# Patient Record
Sex: FEMALE | Race: BLACK OR AFRICAN AMERICAN | Employment: FULL TIME | URBAN - METROPOLITAN AREA
[De-identification: names, ages, dates, MRNs, and addresses within clinical notes are randomized per-mention and may not be internally consistent; named-entity substitution may affect disease eponyms.]

---

## 2017-02-06 ENCOUNTER — OFFICE VISIT (OUTPATIENT)
Dept: ENDOCRINOLOGY | Age: 42
End: 2017-02-06

## 2017-02-06 VITALS
HEART RATE: 57 BPM | DIASTOLIC BLOOD PRESSURE: 91 MMHG | WEIGHT: 149.9 LBS | TEMPERATURE: 98.2 F | BODY MASS INDEX: 23.53 KG/M2 | SYSTOLIC BLOOD PRESSURE: 128 MMHG | HEIGHT: 67 IN | RESPIRATION RATE: 16 BRPM

## 2017-02-06 DIAGNOSIS — E05.90 HYPERTHYROIDISM: Primary | ICD-10-CM

## 2017-02-06 DIAGNOSIS — E05.90 HYPERTHYROIDISM: ICD-10-CM

## 2017-02-06 DIAGNOSIS — E05.00 GRAVES DISEASE: ICD-10-CM

## 2017-02-06 RX ORDER — PROPRANOLOL HYDROCHLORIDE 20 MG/1
TABLET ORAL
Refills: 0 | COMMUNITY
Start: 2017-01-17 | End: 2017-07-19 | Stop reason: DRUGHIGH

## 2017-02-06 NOTE — PROGRESS NOTES
[facility] ENDOCRINOLOGY               Joan Mina MD        [address] 78 [phone] Fax [phone]           Patient Information  Date:2/6/2017  Name : Mayra Childers 39 y.o.     YOB: 1975         Referred by: John Paul Gimenez DO         Chief Complaint   Patient presents with    Thyroid Problem     f/u       History of present illness    Mayra Childers is a 39 y.o. female  here for fu of thyroid. She was diagnosed with hyperthyroidism/Graves disease 10 years ago, intermittently on PTU, reports fluctuating weight. She is off PTU for several months. She attributed hair loss to PTU and hence stopped it. She was seen in June 2016 - and in Oct 2016 - Methimazole was started then     She went to the ER with racing of the heart and was referred to cardiologist.  She was started on Atenolol and echocardiogram was done. She had stopped antithyroid medications in the past thinking that is the cause for hair loss, now reportedly she is taking it consistently 20 mg. No labs. She has gained weight, but not lost.  No sore throat or fever, recent iodine exposure. FH of thyroid disease. Wt Readings from Last 3 Encounters:   02/06/17 149 lb 14.4 oz (68 kg)   10/19/16 145 lb 11.2 oz (66.1 kg)   06/07/16 151 lb (68.5 kg)       Past Medical History   Diagnosis Date    Hx traumatic fracture     Hyperthyroidism        Current Outpatient Prescriptions   Medication Sig    propranolol (INDERAL) 20 mg tablet take 1 tablet by mouth three times a day if needed    methimazole (TAPAZOLE) 10 mg tablet Take 2 Tabs by mouth every morning.  traMADol (ULTRAM) 50 mg tablet Take 50 mg by mouth every six (6) hours as needed for Pain.  Biotin 2,500 mcg cap Take  by mouth daily. No current facility-administered medications for this visit.           Review of Systems:  - Constitutional Symptoms: no fevers, chills, + weight loss  - Eyes: no blurry vision or double vision  - Cardiovascular: no chest pain + palpitations  - Neurological: no numbness, tingling, or headaches  - Psychiatric: no depression or anxiety  - Endocrine:  no polyuria or polydipsia    Physical Examination:  - Blood pressure (!) 128/91, pulse (!) 57, temperature 98.2 °F (36.8 °C), temperature source Oral, resp. rate 16, height 5' 6.5\" (1.689 m), weight 149 lb 14.4 oz (68 kg). Body mass index is 23.83 kg/(m^2). - General: pleasant, no distress, good eye contact  - HEENT: no exopthalmos, no periorbital edema, no scleral/conjunctival injection, EOMI, no lid lag or stare  - Neck: supple, no thyromegaly  - Cardiovascular: regular,  normal S1 and S2,   - Respiratory: clear to auscultation bilaterally  - Gastrointestinal: soft, nontender, nondistended, BS +  - Integumentary: + tremors,   - Neurological: alert and oriented   - Psychiatric: normal mood and affect  - Skin - normal turgor    Data Reviewed:   Lab Results  Component Value Date/Time   TSH 0.428 02/06/2017 04:10 PM   T4, Free 1.19 02/06/2017 04:10 PM      [] Reviewed labs    Assessment/Plan:     1. Hyperthyroidism    2. Graves disease      Grave's disease. Methimazole 20 mg     She was started on Methimazole, the last set of labs from November 2016 showed improvement, free T4 was within normal range and TSH was low which is expected as it lags. Recently she was hospitalized with tachycardia, recheck labs, denies missing medication. If thyroid function tests improved, then it is unlikely  is the cause for her tachycardia. She is on beta-blocker now. Addendum     FT4 is normal so this is not the cause for her palpitations    Need to decrease the MMI to 10 mg now             Follow-up Disposition: Not on File    Thank you for allowing me to participate in the care of this patient.     Amanda hWeeler MD      Patient verbalized understanding

## 2017-02-06 NOTE — PROGRESS NOTES
Laurel Kennedy is a 39 y.o. female here for   Chief Complaint   Patient presents with    Thyroid Problem     f/u       Wt Readings from Last 3 Encounters:   10/19/16 145 lb 11.2 oz (66.1 kg)   06/07/16 151 lb (68.5 kg)     Temp Readings from Last 3 Encounters:   10/19/16 98.3 °F (36.8 °C) (Oral)   06/07/16 97.6 °F (36.4 °C)     BP Readings from Last 3 Encounters:   10/19/16 135/88   06/07/16 116/78     Pulse Readings from Last 3 Encounters:   10/19/16 73   06/07/16 73

## 2017-02-06 NOTE — MR AVS SNAPSHOT
Visit Information Date & Time Provider Department Dept. Phone Encounter #  
 2/6/2017  3:30 PM Jacoby Wiley MD [facility] [phone] [id number] Follow-up Instructions Return in about 3 months (around 5/6/2017). Upcoming Health Maintenance Date Due DTaP/Tdap/Td series (1 - Tdap) 12/4/1996 PAP AKA CERVICAL CYTOLOGY 12/4/1996 INFLUENZA AGE 9 TO ADULT 8/1/2016 Allergies as of 2/6/2017  Review Complete On: 2/6/2017 By: Jacoby Wiley MD  
  
 Severity Noted Reaction Type Reactions Pcn [Penicillins]  06/07/2016    Rash Current Immunizations  Never Reviewed No immunizations on file. Not reviewed this visit You Were Diagnosed With   
  
 Codes Comments Hyperthyroidism    -  Primary ICD-10-CM: E05.90 ICD-9-CM: 242.90 Graves disease     ICD-10-CM: E05.00 ICD-9-CM: 242.00 Vitals BP Pulse Temp Resp Height(growth percentile) Weight(growth percentile) (!) 128/91 (BP 1 Location: Left arm, BP Patient Position: Sitting) (!) 57 98.2 °F (36.8 °C) (Oral) 16 5' 6.5\" (1.689 m) 149 lb 14.4 oz (68 kg) BMI OB Status Smoking Status 23.83 kg/m2 Ablation Never Smoker Vitals History BMI and BSA Data Body Mass Index Body Surface Area  
 23.83 kg/m 2 1.79 m 2 [facility]erred Pharm[facility] Phone [facility]  [place] [phone] Your Updated Medication List  
  
   
This list is accurate as of: 2/6/17  4:09 PM.  Always use your most recent med list.  
  
  
  
  
 Biotin 2,500 mcg Cap Take  by mouth daily. methIMAzole 10 mg tablet Commonly known as:  TAPAZOLE Take 2 Tabs by mouth every morning. propranolol 20 mg tablet Commonly known as:  INDERAL  
take 1 tablet by mouth three times a day if needed  
  
 traMADol 50 mg tablet Commonly known as:  Radha Tate  
 Take 50 mg by mouth every six (6) hours as needed for Pain. Follow-up Instructions Return in about 3 months (around 5/6/2017). Introducing [facility] & HEALTH SERVICES! [facility] introduces [employer] patient portal. Now you can access parts of your medical record, email your doctor's office, and request medication refills online. 1. In your internet browser, go to [email or web address] 2. Click on the First Time User? Click Here link in the Sign In box. You will see the New Member Sign Up page. 3. Enter your [employer] Access Code exactly as it appears below. You will not need to use this code after youve completed the sign-up process. If you do not sign up before the expiration date, you must request a new code. · [employer] Access Code: [email or web address] Expires: 5/7/2017  4:09 PM 
 
4. Enter the last four digits of your Social Security Number (xxxx) and Date of Birth (mm/dd/yyyy) as indicated and click Submit. You will be taken to the next sign-up page. 5. Create a [employer] ID. This will be your [employer] login ID and cannot be changed, so think of one that is secure and easy to remember. 6. Create a [employer] password. You can change your password at any time. 7. Enter your Password Reset Question and Answer. This can be used at a later time if you forget your password. 8. Enter your e-mail address. You will receive e-mail notification when new information is available in [address] 9. Click Sign Up. You can now view and download portions of your medical record. 10. Click the Download Summary menu link to download a portable copy of your medical information. If you have questions, please visit the Frequently Asked Questions section of the [employer] website. Remember, [employer] is NOT to be used for urgent needs. For medical emergencies, dial 911. Now available from your iPhone and Android! Please provide this summary of care documentation to your next provider. Your primary care clinician is listed as Dionne Leonard. If you have any questions after today's visit, please call [phone].

## 2017-02-07 LAB
T3 SERPL-MCNC: 93 NG/DL (ref 71–180)
T4 FREE SERPL-MCNC: 1.19 NG/DL (ref 0.82–1.77)
TSH SERPL DL<=0.005 MIU/L-ACNC: 0.43 UIU/ML (ref 0.45–4.5)

## 2017-02-07 RX ORDER — METHIMAZOLE 10 MG/1
10 TABLET ORAL
Qty: 60 TAB | Refills: 5 | Status: SHIPPED | OUTPATIENT
Start: 2017-02-07 | End: 2017-05-10 | Stop reason: SDUPTHER

## 2017-02-08 NOTE — PROGRESS NOTES
Notify the thyroid hormones have improved and hormones are in normal range and not the reason for racing of the heart     Decrease MMI to 10 mg in AM  From 20 mg     If cardiologist has any questions they can call us

## 2017-02-09 ENCOUNTER — TELEPHONE (OUTPATIENT)
Dept: ENDOCRINOLOGY | Age: 42
End: 2017-02-09

## 2017-02-09 NOTE — TELEPHONE ENCOUNTER
Per Dr. Yaya Lugo, informed pt of msg below. Pt verbalized understanding with no further questions or concerns at this time.

## 2017-02-09 NOTE — TELEPHONE ENCOUNTER
----- Message from Herman Crow MD sent at 2/7/2017  9:54 PM EST -----  Notify the thyroid hormones have improved and hormones are in normal range and not the reason for racing of the heart     Decrease MMI to 10 mg in AM  From 20 mg     If cardiologist has any questions they can call us

## 2017-05-04 ENCOUNTER — TELEPHONE (OUTPATIENT)
Dept: ENDOCRINOLOGY | Age: 42
End: 2017-05-04

## 2017-05-04 DIAGNOSIS — E05.90 HYPERTHYROIDISM: Primary | ICD-10-CM

## 2017-05-10 DIAGNOSIS — E05.90 HYPERTHYROIDISM: ICD-10-CM

## 2017-05-10 RX ORDER — METHIMAZOLE 10 MG/1
10 TABLET ORAL
Qty: 60 TAB | Refills: 5 | Status: SHIPPED | OUTPATIENT
Start: 2017-05-10 | End: 2019-03-29 | Stop reason: DRUGHIGH

## 2017-07-10 ENCOUNTER — OP HISTORICAL/CONVERTED ENCOUNTER (OUTPATIENT)
Dept: OTHER | Age: 42
End: 2017-07-10

## 2017-07-11 ENCOUNTER — OP HISTORICAL/CONVERTED ENCOUNTER (OUTPATIENT)
Dept: OTHER | Age: 42
End: 2017-07-11

## 2017-07-13 ENCOUNTER — IP HISTORICAL/CONVERTED ENCOUNTER (OUTPATIENT)
Dept: OTHER | Age: 42
End: 2017-07-13

## 2017-07-18 ENCOUNTER — ED HISTORICAL/CONVERTED ENCOUNTER (OUTPATIENT)
Dept: OTHER | Age: 42
End: 2017-07-18

## 2017-07-19 ENCOUNTER — OFFICE VISIT (OUTPATIENT)
Dept: ENDOCRINOLOGY | Age: 42
End: 2017-07-19

## 2017-07-19 VITALS
WEIGHT: 160 LBS | BODY MASS INDEX: 25.11 KG/M2 | TEMPERATURE: 97.6 F | RESPIRATION RATE: 18 BRPM | HEIGHT: 67 IN | DIASTOLIC BLOOD PRESSURE: 78 MMHG | SYSTOLIC BLOOD PRESSURE: 113 MMHG | HEART RATE: 80 BPM

## 2017-07-19 DIAGNOSIS — E05.00 GRAVES DISEASE: Primary | ICD-10-CM

## 2017-07-19 DIAGNOSIS — E05.90 HYPERTHYROIDISM: Primary | ICD-10-CM

## 2017-07-19 DIAGNOSIS — R53.83 FATIGUE, UNSPECIFIED TYPE: ICD-10-CM

## 2017-07-19 DIAGNOSIS — E16.2 HYPOGLYCEMIA: ICD-10-CM

## 2017-07-19 DIAGNOSIS — E05.90 HYPERTHYROIDISM: ICD-10-CM

## 2017-07-19 DIAGNOSIS — R42 DIZZINESS: ICD-10-CM

## 2017-07-19 RX ORDER — LANCETS
EACH MISCELLANEOUS
Qty: 100 EACH | Refills: 11 | Status: SHIPPED | OUTPATIENT
Start: 2017-07-19 | End: 2017-07-20 | Stop reason: SDUPTHER

## 2017-07-19 RX ORDER — IBUPROFEN 200 MG
800 TABLET ORAL
COMMUNITY
End: 2020-10-01 | Stop reason: ALTCHOICE

## 2017-07-19 RX ORDER — INSULIN PUMP SYRINGE, 3 ML
EACH MISCELLANEOUS
Qty: 1 KIT | Refills: 0 | Status: SHIPPED | OUTPATIENT
Start: 2017-07-19 | End: 2017-07-20 | Stop reason: SDUPTHER

## 2017-07-19 RX ORDER — PHENAZOPYRIDINE HYDROCHLORIDE 200 MG/1
TABLET, FILM COATED ORAL
COMMUNITY
End: 2020-10-01 | Stop reason: ALTCHOICE

## 2017-07-19 RX ORDER — PROPRANOLOL HYDROCHLORIDE 10 MG/1
10 TABLET ORAL 3 TIMES DAILY
Qty: 90 TAB | Refills: 11 | Status: SHIPPED | OUTPATIENT
Start: 2017-07-19 | End: 2020-10-01 | Stop reason: ALTCHOICE

## 2017-07-19 NOTE — MR AVS SNAPSHOT
Visit Information Date & Time Provider Department Dept. Phone Encounter #  
 7/19/2017  9:45 AM Roosevelt Gutierrez MD [facility] [phone] [id number] Follow-up Instructions Return in about 2 months (around 9/19/2017). Upcoming Health Maintenance Date Due DTaP/Tdap/Td series (1 - Tdap) 12/4/1996 PAP AKA CERVICAL CYTOLOGY 12/4/1996 INFLUENZA AGE 9 TO ADULT 8/1/2017 Allergies as of 7/19/2017  Review Complete On: 7/19/2017 By: Roosevelt Gutierrez MD  
  
 Severity Noted Reaction Type Reactions Pcn [Penicillins]  06/07/2016    Rash Current Immunizations  Never Reviewed No immunizations on file. Not reviewed this visit You Were Diagnosed With   
  
 Codes Comments Graves disease    -  Primary ICD-10-CM: E05.00 ICD-9-CM: 242.00 Hyperthyroidism     ICD-10-CM: E05.90 ICD-9-CM: 242.90 Vitals BP Pulse Temp Resp Height(growth percentile) Weight(growth percentile) 113/78 (BP 1 Location: Left arm, BP Patient Position: Sitting) 80 97.6 °F (36.4 °C) (Oral) 18 5' 6.5\" (1.689 m) 160 lb (72.6 kg) BMI OB Status Smoking Status 25.44 kg/m2 Hysterectomy Never Smoker Vitals History BMI and BSA Data Body Mass Index Body Surface Area  
 25.44 kg/m 2 1.85 m 2 Preferred Pharmacy Pharmacy Name Phone [facility] [address] [phone] Your Updated Medication List  
  
   
This list is accurate as of: 7/19/17 11:04 AM.  Always use your most recent med list.  
  
  
  
  
 Biotin 2,500 mcg Cap Take  by mouth daily. CIPRO PO Take  by mouth. methIMAzole 10 mg tablet Commonly known as:  TAPAZOLE Take 1 Tab by mouth every morning. MOTRIN  mg tablet Generic drug:  ibuprofen Take 800 mg by mouth. OXYCODONE PO Take  by mouth.  
  
 phenazopyridine 200 mg tablet Commonly known as:  PYRIDIUM  
 Take  by mouth three (3) times daily as needed for Pain.  
  
 propranolol 20 mg tablet Commonly known as:  INDERAL  
take 1 tablet by mouth three times a day if needed  
  
 traMADol 50 mg tablet Commonly known as:  ULTRAM  
Take 50 mg by mouth every six (6) hours as needed for Pain. Follow-up Instructions Return in about 2 months (around 9/19/2017). Introducing \[facility]\"" & HEALTH SERVICES! [facility] introduces [employer] patient portal. Now you can access parts of your medical record, email your doctor's office, and request medication refills online. 1. In your internet browser, go to [email or web address] 2. Click on the First Time User? Click Here link in the Sign In box. You will see the New Member Sign Up page. 3. Enter your [employer] Access Code exactly as it appears below. You will not need to use this code after youve completed the sign-up process. If you do not sign up before the expiration date, you must request a new code. · [employer] Access Code: [email or web address] Expires: 10/17/2017 10:59 AM 
 
4. Enter the last four digits of your Social Security Number (xxxx) and Date of Birth (mm/dd/yyyy) as indicated and click Submit. You will be taken to the next sign-up page. 5. Create a [employer] ID. This will be your [employer] login ID and cannot be changed, so think of one that is secure and easy to remember. 6. Create a [employer] password. You can change your password at any time. 7. Enter your Password Reset Question and Answer. This can be used at a later time if you forget your password. 8. Enter your e-mail address. You will receive e-mail notification when new information is available in [address] 9. Click Sign Up. You can now view and download portions of your medical record. 10. Click the Download Summary menu link to download a portable copy of your medical information.  
 
If you have questions, please visit the Frequently Asked Questions section of the [employer] [place]. Remember, [employer] is NOT to be used for urgent needs. For medical emergencies, dial 911. Now available from your iPhone and Android! Please provide this summary of care documentation to your next provider. Your primary care clinician is listed as Waleska Miles. If you have any questions after today's visit, please call [phone].

## 2017-07-19 NOTE — PROGRESS NOTES
Lisandra Amos MD        [address] 78 [phone] Fax [phone]           Patient Information  Date:7/19/2017  Name : Dorota العراقي 39 y.o.     YOB: 1975         Referred by: Cortez Douglas DO         Chief Complaint   Patient presents with    Thyroid Problem     rapid heart rate       History of present illness    Dorota العراقي is a 39 y.o. female  here for fu of thyroid. She presents with multiple symptoms:  Racing of the heart, fatigue, increased appetite and has to eat every two hours, post meal she has extreme tiredness and dizziness. No new medications. She is compliant with Methimazole. No fever or sore throat. She sometimes has shakiness. Prior hx       She was diagnosed with hyperthyroidism/Graves disease 10 years ago, intermittently on PTU, reports fluctuating weight. She is off PTU for several months. She attributed hair loss to PTU and hence stopped it. She had stopped antithyroid medications in the past thinking that is the cause for hair loss             FH of thyroid disease. Wt Readings from Last 3 Encounters:   07/19/17 160 lb (72.6 kg)   02/06/17 149 lb 14.4 oz (68 kg)   10/19/16 145 lb 11.2 oz (66.1 kg)       Past Medical History:   Diagnosis Date    Hx traumatic fracture     Hyperthyroidism        Current Outpatient Prescriptions   Medication Sig    ibuprofen (MOTRIN IB) 200 mg tablet Take 800 mg by mouth.  OXYCODONE HCL (OXYCODONE PO) Take  by mouth.  CIPROFLOXACIN HCL (CIPRO PO) Take  by mouth.  phenazopyridine (PYRIDIUM) 200 mg tablet Take  by mouth three (3) times daily as needed for Pain.  methIMAzole (TAPAZOLE) 10 mg tablet Take 1 Tab by mouth every morning.  glucose blood VI test strips (ONETOUCH ULTRA TEST) strip Use to check blood sugars 2-3 times daily.  Dx code: E16.2    Blood-Glucose Meter (ONETOUCH ULTRAMINI) monitoring kit Use to check blood sugars 2-3 times daily. Dx code: E16.2    Lancets misc Use to check blood sugars 2-3 times daily. Dx code: E16.2    propranolol (INDERAL) 10 mg tablet Take 1 Tab by mouth three (3) times daily.  traMADol (ULTRAM) 50 mg tablet Take 50 mg by mouth every six (6) hours as needed for Pain.  Biotin 2,500 mcg cap Take  by mouth daily. No current facility-administered medications for this visit. Review of Systems:  - Constitutional Symptoms: no fevers, chills,  - Eyes: no blurry vision or double vision  - Cardiovascular: no chest pain + palpitations  - Neurological: no numbness, tingling, or headaches  - Psychiatric: no depression or anxiety  - Endocrine:  no polyuria or polydipsia    Physical Examination:  - Blood pressure 113/78, pulse 80, temperature 97.6 °F (36.4 °C), temperature source Oral, resp. rate 18, height 5' 6.5\" (1.689 m), weight 160 lb (72.6 kg). Body mass index is 25.44 kg/(m^2). - General: pleasant, no distress, good eye contact  - HEENT: no exopthalmos, no periorbital edema, no scleral/conjunctival injection, EOMI, no lid lag or stare  - Neck: supple, no thyromegaly  - Cardiovascular: regular,  normal S1 and S2,   - Respiratory: clear to auscultation bilaterally  - Gastrointestinal: soft, nontender, nondistended, BS +  - Integumentary: no tremors,   - Neurological: alert and oriented   - Psychiatric: normal mood and affect  - Skin - normal turgor    Data Reviewed:   Lab Results   Component Value Date/Time    TSH 0.428 02/06/2017 04:10 PM    T4, Free 1.19 02/06/2017 04:10 PM      [] Reviewed labs    Assessment/Plan:     1. Graves disease    2. Hyperthyroidism    3. Hypoglycemia    4. Dizziness    5. Fatigue, unspecified type      Grave's disease. Methimazole 10 mg , restarted in 10/2016   Hx of poor compliance with MMI     She has multiple symptoms, cannot be explained by hyperthyroidism alone. She is not tachycardic here, no signs of hyperadrenergic symptoms.     Increase protein, check blood glucose before and two hours after meals. MAy have postprandial hypoglycemia . Check labs. Check for anemia also as she has very heavy cycles. TSH and free T4. Discussed possibility of depression. If blood tests are normal, follow up with PCP, Dr. Tahir Sage. Follow-up Disposition:  Return in about 2 months (around 9/19/2017). Thank you for allowing me to participate in the care of this patient.     Lashanda Bass MD      Patient verbalized understanding

## 2017-07-19 NOTE — PROGRESS NOTES
July Villalba is a 39 y.o. female here for   Chief Complaint   Patient presents with    Thyroid Problem     rapid heart rate       1. Have you been to the ER, urgent care clinic since your last visit? Hospitalized since your last visit? - yes [facility] for back pain, abdominal pain, urinary pain and post surgery. 2. Have you seen or consulted any other health care providers outside of the [address] [place] [address] since your last visit?   Include any pap smears or colon screening.- Dr Osvaldo Tee for fibroid tumors    Wt Readings from Last 3 Encounters:   07/19/17 160 lb (72.6 kg)   02/06/17 149 lb 14.4 oz (68 kg)   10/19/16 145 lb 11.2 oz (66.1 kg)     Temp Readings from Last 3 Encounters:   07/19/17 97.6 °F (36.4 °C) (Oral)   02/06/17 98.2 °F (36.8 °C) (Oral)   10/19/16 98.3 °F (36.8 °C) (Oral)     BP Readings from Last 3 Encounters:   07/19/17 113/78   02/06/17 (!) 128/91   10/19/16 135/88     Pulse Readings from Last 3 Encounters:   07/19/17 80   02/06/17 (!) 57   10/19/16 73

## 2017-07-19 NOTE — LETTER
7/27/2017 9:05 AM 
 
Ms. Tracey Fernandez [address] [facility] [address] Dear Tracey Fernandez: Our office has tried to contact you regarding your lab results. Please find your most recent results below. Resulted Orders TSH 3RD GENERATION Result Value Ref Range TSH 2.630 0.450 - 4.500 uIU/mL Narrative Performed at:  [address]  [id number] : Casey Ennis MD, Phone:  [phone] T4, FREE Result Value Ref Range T4, Free 0.89 0.82 - 1.77 ng/dL Narrative Performed at:  [address]  [id number] : Casey Ennis MD, Phone:  [phone] T3 TOTAL Result Value Ref Range T3, total 83 71 - 180 ng/dL Narrative Performed at:  [address]  [id number] : Casey Ennis MD, Phone:  [phone] HEMOGLOBIN A1C WITH EAG Result Value Ref Range Hemoglobin A1c 4.9 4.8 - 5.6 % Comment:  
            Pre-diabetes: 5.7 - 6.4 Diabetes: >6.4 Glycemic control for adults with diabetes: <7.0 Estimated average glucose 94 mg/dL Narrative Performed at:  [address]  [id number] : Casey Ennis MD, Phone:  [phone] CBC WITH AUTOMATED DIFF Result Value Ref Range WBC 11.5 (H) 3.4 - 10.8 x10E3/uL  
 RBC 3.06 (L) 3.77 - 5.28 x10E6/uL HGB 8.0 (L) 11.1 - 15.9 g/dL HCT 25.5 (L) 34.0 - 46.6 % MCV 83 79 - 97 fL  
 MCH 26.1 (L) 26.6 - 33.0 pg  
 MCHC 31.4 (L) 31.5 - 35.7 g/dL  
 RDW 14.5 12.3 - 15.4 % PLATELET 636 438 - 024 x10E3/uL NEUTROPHILS 74 % Lymphocytes 16 % MONOCYTES 7 % EOSINOPHILS 3 % BASOPHILS 0 %  
 ABS. NEUTROPHILS 8.5 (H) 1.4 - 7.0 x10E3/uL Abs Lymphocytes 1.8 0.7 - 3.1 x10E3/uL  
 ABS. MONOCYTES 0.8 0.1 - 0.9 x10E3/uL  
 ABS. EOSINOPHILS 0.3 0.0 - 0.4 x10E3/uL ABS. BASOPHILS 0.0 0.0 - 0.2 x10E3/uL IMMATURE GRANULOCYTES 0 %  
 ABS. IMM. GRANS. 0.0 0.0 - 0.1 x10E3/uL Narrative Performed at:  [address]  [id number] : Noreen Grossman MD, Phone:  [phone] METABOLIC PANEL, BASIC Result Value Ref Range Glucose 80 65 - 99 mg/dL BUN 7 6 - 24 mg/dL Creatinine 0.68 0.57 - 1.00 mg/dL GFR est non- >59 mL/min/1.73 GFR est  >59 mL/min/1.73  
 BUN/Creatinine ratio 10 9 - 23 Sodium 139 134 - 144 mmol/L Potassium 4.3 3.5 - 5.2 mmol/L Chloride 100 96 - 106 mmol/L  
 CO2 27 18 - 29 mmol/L Calcium 9.3 8.7 - 10.2 mg/dL Narrative Performed at:  [address]  [id number] : Noreen Grossman MD, Phone:  [phone] RECOMMENDATIONS: 
 
Dr Fabián Coburn has reviewed your results and said your thyroid is normal. She asks that you decrease Methimazole to 5 mg. Dr Fabián Coburn says your symptoms are due to anemia and you may have infection based on tests. We will forward results to your PCP and she asks that you call your PCP. She says you  need to find the source of anemia. She also says you can take Iron tablets 65 mg twice daily. Please call me if you have any questions: [phone] Sincerely, Mikala Charlton MD

## 2017-07-20 ENCOUNTER — TELEPHONE (OUTPATIENT)
Dept: ENDOCRINOLOGY | Age: 42
End: 2017-07-20

## 2017-07-20 DIAGNOSIS — E16.2 HYPOGLYCEMIA, UNSPECIFIED: Primary | ICD-10-CM

## 2017-07-20 LAB
BASOPHILS # BLD AUTO: 0 X10E3/UL (ref 0–0.2)
BASOPHILS NFR BLD AUTO: 0 %
BUN SERPL-MCNC: 7 MG/DL (ref 6–24)
BUN/CREAT SERPL: 10 (ref 9–23)
CALCIUM SERPL-MCNC: 9.3 MG/DL (ref 8.7–10.2)
CHLORIDE SERPL-SCNC: 100 MMOL/L (ref 96–106)
CO2 SERPL-SCNC: 27 MMOL/L (ref 18–29)
CREAT SERPL-MCNC: 0.68 MG/DL (ref 0.57–1)
EOSINOPHIL # BLD AUTO: 0.3 X10E3/UL (ref 0–0.4)
EOSINOPHIL NFR BLD AUTO: 3 %
ERYTHROCYTE [DISTWIDTH] IN BLOOD BY AUTOMATED COUNT: 14.5 % (ref 12.3–15.4)
EST. AVERAGE GLUCOSE BLD GHB EST-MCNC: 94 MG/DL
GLUCOSE SERPL-MCNC: 80 MG/DL (ref 65–99)
HBA1C MFR BLD: 4.9 % (ref 4.8–5.6)
HCT VFR BLD AUTO: 25.5 % (ref 34–46.6)
HGB BLD-MCNC: 8 G/DL (ref 11.1–15.9)
IMM GRANULOCYTES # BLD: 0 X10E3/UL (ref 0–0.1)
IMM GRANULOCYTES NFR BLD: 0 %
LYMPHOCYTES # BLD AUTO: 1.8 X10E3/UL (ref 0.7–3.1)
LYMPHOCYTES NFR BLD AUTO: 16 %
MCH RBC QN AUTO: 26.1 PG (ref 26.6–33)
MCHC RBC AUTO-ENTMCNC: 31.4 G/DL (ref 31.5–35.7)
MCV RBC AUTO: 83 FL (ref 79–97)
MONOCYTES # BLD AUTO: 0.8 X10E3/UL (ref 0.1–0.9)
MONOCYTES NFR BLD AUTO: 7 %
NEUTROPHILS # BLD AUTO: 8.5 X10E3/UL (ref 1.4–7)
NEUTROPHILS NFR BLD AUTO: 74 %
PLATELET # BLD AUTO: 327 X10E3/UL (ref 150–379)
POTASSIUM SERPL-SCNC: 4.3 MMOL/L (ref 3.5–5.2)
RBC # BLD AUTO: 3.06 X10E6/UL (ref 3.77–5.28)
SODIUM SERPL-SCNC: 139 MMOL/L (ref 134–144)
T3 SERPL-MCNC: 83 NG/DL (ref 71–180)
T4 FREE SERPL-MCNC: 0.89 NG/DL (ref 0.82–1.77)
TSH SERPL DL<=0.005 MIU/L-ACNC: 2.63 UIU/ML (ref 0.45–4.5)
WBC # BLD AUTO: 11.5 X10E3/UL (ref 3.4–10.8)

## 2017-07-20 RX ORDER — INSULIN PUMP SYRINGE, 3 ML
EACH MISCELLANEOUS
Qty: 1 KIT | Refills: 0 | Status: SHIPPED | OUTPATIENT
Start: 2017-07-20 | End: 2021-02-10 | Stop reason: ALTCHOICE

## 2017-07-20 RX ORDER — LANCETS 28 GAUGE
EACH MISCELLANEOUS
Qty: 100 LANCET | Refills: 11 | Status: SHIPPED | OUTPATIENT
Start: 2017-07-20 | End: 2021-02-10 | Stop reason: ALTCHOICE

## 2017-07-20 NOTE — TELEPHONE ENCOUNTER
----- Message from Germaine Estrada sent at 7/19/2017  6:13 PM EDT -----  Regarding: Dr. Dalila Abarca, pharmacist, with [address] states the supplies are not covered by the Pt's insurance. Catherine Valdez is requesting a prescription for Freestyle Lite Strips, lancets and meter. Best contact number is [phone].     Thanks,  Dustin Denton

## 2017-07-23 NOTE — PROGRESS NOTES
Thyroid is normal - decrease Methimazole to 5 mg , update in [facility]    Her symptoms are due to anemia and may have infection based on tests. Will forward results to PCP and to call PCP , need to find the source of anemia    Take Iron tablets 65 mg BID

## 2017-07-24 ENCOUNTER — TELEPHONE (OUTPATIENT)
Dept: ENDOCRINOLOGY | Age: 42
End: 2017-07-24

## 2017-07-24 NOTE — TELEPHONE ENCOUNTER
Letter mailed      ----- Message from Néstor Choe MD sent at 7/22/2017 10:05 PM EDT -----  Thyroid is normal - decrease Methimazole to 5 mg , update in [facility]    Her symptoms are due to anemia and may have infection based on tests. Will forward results to PCP and to call PCP , need to find the source of anemia    Take Iron tablets 65 mg BID

## 2017-07-27 ENCOUNTER — OP HISTORICAL/CONVERTED ENCOUNTER (OUTPATIENT)
Dept: OTHER | Age: 42
End: 2017-07-27

## 2019-03-21 ENCOUNTER — TELEPHONE (OUTPATIENT)
Dept: ENDOCRINOLOGY | Age: 44
End: 2019-03-21

## 2019-03-21 DIAGNOSIS — E05.00 GRAVES DISEASE: ICD-10-CM

## 2019-03-21 DIAGNOSIS — E05.90 HYPERTHYROIDISM: Primary | ICD-10-CM

## 2019-03-22 ENCOUNTER — TELEPHONE (OUTPATIENT)
Dept: ENDOCRINOLOGY | Age: 44
End: 2019-03-22

## 2019-03-22 NOTE — TELEPHONE ENCOUNTER
----- Message from Viktoriya Pagan sent at 3/22/2019 12:38 PM EDT -----  Regarding: Dr. Gregor Alston  Pt is calling to  order for lab work. Upcoming appt Tues 3/26. [phone].

## 2019-03-26 ENCOUNTER — OFFICE VISIT (OUTPATIENT)
Dept: ENDOCRINOLOGY | Age: 44
End: 2019-03-26

## 2019-03-26 VITALS
BODY MASS INDEX: 26.53 KG/M2 | RESPIRATION RATE: 14 BRPM | DIASTOLIC BLOOD PRESSURE: 87 MMHG | OXYGEN SATURATION: 97 % | SYSTOLIC BLOOD PRESSURE: 126 MMHG | HEIGHT: 67 IN | WEIGHT: 169 LBS | HEART RATE: 72 BPM | TEMPERATURE: 97.7 F

## 2019-03-26 DIAGNOSIS — E05.90 HYPERTHYROIDISM: Primary | ICD-10-CM

## 2019-03-26 DIAGNOSIS — E53.8 VITAMIN B12 DEFICIENCY: ICD-10-CM

## 2019-03-26 DIAGNOSIS — D64.9 ANEMIA, UNSPECIFIED TYPE: ICD-10-CM

## 2019-03-26 DIAGNOSIS — E05.00 GRAVES DISEASE: ICD-10-CM

## 2019-03-26 NOTE — LETTER
3/26/19 Patient: Melissa Mckeon YOB: 1975 Date of Visit: 3/26/2019 Ignacio Troncoso DO 
[address] [facility] [address] [facility] [address] VIA Facsimile: [phone] Dear Ignacio Troncoso DO, Thank you for referring Ms. Rossy Rodriguez to [address] [address] for evaluation. My notes for this consultation are attached. If you have questions, please do not hesitate to call me. I look forward to following your patient along with you. Sincerely, Nia Sage MD

## 2019-03-26 NOTE — LETTER
NOTIFICATION RETURN TO WORK / SCHOOL 
 
3/26/2019 11:15 AM 
 
Ms. Smion Strange [address] [facility] [address] To Whom It May Concern: 
 
Simon Strange is currently under the care of [address] [address]. She will return to work/school on: 03/26/2019. If there are questions or concerns please have the patient contact our office. Sincerely, Elaine Linares MD

## 2019-03-26 NOTE — PROGRESS NOTES
[facility] DIABETES AND ENDOCRINOLOGY Arturo Ruff MD 
 
    [address] 78 [phone] Fax [phone] Patient Information Date:3/26/2019 Name : Yael Rubi 37 y.o.    
YOB: 1975 Referred by: Rayo Pandey DO Chief Complaint Patient presents with  Thyroid Problem History of present illness Yael Rubi is a 37 y.o. female  here for fu of thyroid. She was seen in 2017, was on methimazole. Lost insurance, off medications Complains of weight fluctuation, tachycardia, racing of the heart, nervousness No recent thyroid function test 
No anxiety, no new medications She had anemia in the past, status post hysterectomy in 2018 Prior hx She was diagnosed with hyperthyroidism/Graves disease 10 years ago, intermittently on PTU, reports fluctuating weight. She is off PTU for several months. She attributed hair loss to PTU and hence stopped it. She had stopped antithyroid medications in the past thinking that is the cause for hair loss FH of thyroid disease. Wt Readings from Last 3 Encounters:  
03/26/19 169 lb (76.7 kg) 07/19/17 160 lb (72.6 kg) 02/06/17 149 lb 14.4 oz (68 kg) Past Medical History:  
Diagnosis Date  Hx traumatic fracture  Hyperthyroidism Current Outpatient Medications Medication Sig  
 glucose blood VI test strips (FREESTYLE LITE STRIPS) strip Use to check blood glucose 2-3 times daily. DX code E16.2  Blood-Glucose Meter (FREESTYLE LITE METER) monitoring kit Use to check blood glucose 2-3 times daily. DX code E16.2  lancets (FREESTYLE LANCETS) 28 gauge misc Use to check blood glucose 2-3 times daily. DX code E16.2  ibuprofen (MOTRIN IB) 200 mg tablet Take 800 mg by mouth.  OXYCODONE HCL (OXYCODONE PO) Take  by mouth.  CIPROFLOXACIN HCL (CIPRO PO) Take  by mouth.  phenazopyridine (PYRIDIUM) 200 mg tablet Take  by mouth three (3) times daily as needed for Pain.  propranolol (INDERAL) 10 mg tablet Take 1 Tab by mouth three (3) times daily.  methIMAzole (TAPAZOLE) 10 mg tablet Take 1 Tab by mouth every morning.  traMADol (ULTRAM) 50 mg tablet Take 50 mg by mouth every six (6) hours as needed for Pain.  Biotin 2,500 mcg cap Take  by mouth daily. No current facility-administered medications for this visit. Review of Systems: 
- Constitutional Symptoms: no fevers, chills, 
- Eyes: no blurry vision or double vision - Cardiovascular: no chest pain + palpitations - Neurological: no numbness, tingling, or headaches - Psychiatric: no depression or anxiety - Endocrine:  no polyuria or polydipsia Physical Examination: - Blood pressure 126/87, pulse 72, temperature 97.7 °F (36.5 °C), temperature source Oral, resp. rate 14, height 5' 6.5\" (1.689 m), weight 169 lb (76.7 kg), SpO2 97 %. Body mass index is 26.87 kg/m². - General: pleasant, no distress, good eye contact 
- HEENT: no exopthalmos, no periorbital edema, no scleral/conjunctival injection, EOMI, no lid lag or stare 
- Neck: supple, no thyromegaly - Cardiovascular: regular,  normal S1 and S2,  
- Respiratory: clear to auscultation bilaterally - Gastrointestinal: soft, nontender, nondistended, BS + 
- Integumentary: no tremors,  
- Neurological: alert and oriented - Psychiatric: normal mood and affect 
- Skin - normal turgor Data Reviewed:  
Lab Results Component Value Date/Time TSH 2.630 07/19/2017 11:22 AM  
 T4, Free 0.89 07/19/2017 11:22 AM  
  
[] Reviewed labs Assessment/Plan: 1. Hyperthyroidism 2. Graves disease Grave's disease. Hx of poor compliance with MMI Recheck labs, rule out anemia Check vitamin B12 Avoid skipping meals, avoid artificial sweeteners Continue to follow-up with PCP  
 
 
 
 
 
 Thank you for allowing me to participate in the care of this patient. Nettie Haij MD 
 
 
Patient verbalized understanding

## 2019-03-26 NOTE — PROGRESS NOTES
Bruce Arriaza is a 37 y.o. female here for Chief Complaint Patient presents with  Thyroid Problem 1. Have you been to the ER, urgent care clinic since your last visit? Hospitalized since your last visit? -no 
 
2. Have you seen or consulted any other health care providers outside of the [address] [place] [address] since your last visit? Include any pap smears or colon screening. -PCP one yr ago

## 2019-03-28 LAB
BASOPHILS # BLD AUTO: 0 X10E3/UL (ref 0–0.2)
BASOPHILS NFR BLD AUTO: 0 %
ENDOMYSIUM IGA SER QL: NEGATIVE
EOSINOPHIL # BLD AUTO: 0.1 X10E3/UL (ref 0–0.4)
EOSINOPHIL NFR BLD AUTO: 2 %
ERYTHROCYTE [DISTWIDTH] IN BLOOD BY AUTOMATED COUNT: 14.7 % (ref 12.3–15.4)
GLIADIN PEPTIDE IGA SER-ACNC: 4 UNITS (ref 0–19)
GLIADIN PEPTIDE IGG SER-ACNC: 3 UNITS (ref 0–19)
HCT VFR BLD AUTO: 39.4 % (ref 34–46.6)
HGB BLD-MCNC: 13 G/DL (ref 11.1–15.9)
IGA SERPL-MCNC: 156 MG/DL (ref 87–352)
IMM GRANULOCYTES # BLD AUTO: 0 X10E3/UL (ref 0–0.1)
IMM GRANULOCYTES NFR BLD AUTO: 0 %
LYMPHOCYTES # BLD AUTO: 1.6 X10E3/UL (ref 0.7–3.1)
LYMPHOCYTES NFR BLD AUTO: 22 %
MCH RBC QN AUTO: 26.3 PG (ref 26.6–33)
MCHC RBC AUTO-ENTMCNC: 33 G/DL (ref 31.5–35.7)
MCV RBC AUTO: 80 FL (ref 79–97)
MONOCYTES # BLD AUTO: 0.7 X10E3/UL (ref 0.1–0.9)
MONOCYTES NFR BLD AUTO: 10 %
NEUTROPHILS # BLD AUTO: 4.9 X10E3/UL (ref 1.4–7)
NEUTROPHILS NFR BLD AUTO: 66 %
PLATELET # BLD AUTO: 266 X10E3/UL (ref 150–379)
RBC # BLD AUTO: 4.95 X10E6/UL (ref 3.77–5.28)
T4 FREE SERPL-MCNC: 1.23 NG/DL (ref 0.82–1.77)
TSH SERPL DL<=0.005 MIU/L-ACNC: 0.22 UIU/ML (ref 0.45–4.5)
TTG IGA SER-ACNC: <2 U/ML (ref 0–3)
TTG IGG SER-ACNC: <2 U/ML (ref 0–5)
VIT B12 SERPL-MCNC: 580 PG/ML (ref 232–1245)
WBC # BLD AUTO: 7.3 X10E3/UL (ref 3.4–10.8)

## 2019-03-29 ENCOUNTER — TELEPHONE (OUTPATIENT)
Dept: ENDOCRINOLOGY | Age: 44
End: 2019-03-29

## 2019-03-29 DIAGNOSIS — E05.90 HYPERTHYROIDISM: Primary | ICD-10-CM

## 2019-03-29 RX ORDER — METHIMAZOLE 5 MG/1
2.5 TABLET ORAL DAILY
Qty: 15 TAB | Refills: 5 | Status: SHIPPED | OUTPATIENT
Start: 2019-03-29 | End: 2020-10-01 | Stop reason: ALTCHOICE

## 2019-03-29 NOTE — TELEPHONE ENCOUNTER
----- Message from Martha Layne MD sent at 3/28/2019 10:43 PM EDT -----  Methimazole 2.5 mg daily, she still has borderline overactive thyroid

## 2019-03-29 NOTE — TELEPHONE ENCOUNTER
Per Dr. Gilda Carrion, informed pt of result note, as noted above. Pt verbalized understanding and asked about mychart sign up. Informed her there should've been direction on her last check out papers. Pt stated she no longer has them. Attempted to help set up over the phone. Unsuccessful. Provided her with help desk number. Also re-mailed. Instructions from last AVS. No further questions or concerns at this time.

## 2020-10-01 ENCOUNTER — OFFICE VISIT (OUTPATIENT)
Dept: FAMILY MEDICINE CLINIC | Age: 45
End: 2020-10-01
Payer: COMMERCIAL

## 2020-10-01 VITALS
BODY MASS INDEX: 26.65 KG/M2 | DIASTOLIC BLOOD PRESSURE: 96 MMHG | OXYGEN SATURATION: 100 % | WEIGHT: 165.8 LBS | SYSTOLIC BLOOD PRESSURE: 134 MMHG | TEMPERATURE: 97.3 F | HEIGHT: 66 IN | HEART RATE: 82 BPM

## 2020-10-01 DIAGNOSIS — F41.9 ANXIETY: ICD-10-CM

## 2020-10-01 DIAGNOSIS — E11.65 TYPE 2 DIABETES MELLITUS WITH HYPERGLYCEMIA, WITHOUT LONG-TERM CURRENT USE OF INSULIN (HCC): ICD-10-CM

## 2020-10-01 DIAGNOSIS — E05.90 HYPERTHYROIDISM: ICD-10-CM

## 2020-10-01 DIAGNOSIS — Z87.81 HX TRAUMATIC FRACTURE: Primary | ICD-10-CM

## 2020-10-01 DIAGNOSIS — L30.9 ECZEMA, UNSPECIFIED TYPE: ICD-10-CM

## 2020-10-01 PROCEDURE — 99203 OFFICE O/P NEW LOW 30 MIN: CPT | Performed by: FAMILY MEDICINE

## 2020-10-01 RX ORDER — HYDROCODONE BITARTRATE AND ACETAMINOPHEN 5; 325 MG/1; MG/1
TABLET ORAL
COMMUNITY
End: 2020-10-15 | Stop reason: ALTCHOICE

## 2020-10-01 RX ORDER — KETOROLAC TROMETHAMINE 10 MG/1
TABLET, FILM COATED ORAL
COMMUNITY
End: 2020-10-15 | Stop reason: ALTCHOICE

## 2020-10-01 RX ORDER — CLOBETASOL PROPIONATE 0.5 MG/G
OINTMENT TOPICAL 2 TIMES DAILY
Qty: 45 G | Refills: 2 | Status: SHIPPED | OUTPATIENT
Start: 2020-10-01 | End: 2021-02-10 | Stop reason: ALTCHOICE

## 2020-10-01 NOTE — PROGRESS NOTES
ID:  Clarisse Tong , 40 y.o., female  CC:   New patient    HPI:   Jeimy Alegria comes in as a new patient , last visted here 2016. She has hand pain, went to ER and also complained of back pain and they took xray and she had a back fracture. Was then seen by orthopedics and they gave her a back brace and time off work. She has been having some issues regarding stress including work and life issues. Not sleeping, not focusing, not concentrating. Trying to handle it herself but is not able to do so. When she eats food she doesn't feel good so she has stopped eating. She has seen an endo years ago for this and she did not get better after labs resolved. Has palpitations and chest pain. Also has a rash on her arms and legs. Very itchy and cortisone doesn't help. Says the fracture is in upper back towards the left. PMHX:    Patient Active Problem List   Diagnosis Code    Hyperthyroidism E05.90    Graves disease E05.00    Weight loss R63.4         ALLERGIES:   Allergies   Allergen Reactions    Pcn [Penicillins] Hives and Rash         MEDICATIONS:     Current Outpatient Medications:     HYDROcodone-acetaminophen (NORCO) 5-325 mg per tablet, Take  by mouth every four (4) hours as needed for Pain., Disp: , Rfl:     ketorolac (TORADOL) 10 mg tablet, Take  by mouth every six (6) hours as needed for Pain., Disp: , Rfl:     ibuprofen (MOTRIN IB) 200 mg tablet, Take 800 mg by mouth., Disp: , Rfl:     Biotin 2,500 mcg cap, Take  by mouth daily. , Disp: , Rfl:     methIMAzole (TAPAZOLE) 5 mg tablet, Take 0.5 Tabs by mouth daily. , Disp: 15 Tab, Rfl: 5    glucose blood VI test strips (FREESTYLE LITE STRIPS) strip, Use to check blood glucose 2-3 times daily. DX code E16.2, Disp: 100 Strip, Rfl: 11    Blood-Glucose Meter (FREESTYLE LITE METER) monitoring kit, Use to check blood glucose 2-3 times daily.  DX code E16.2, Disp: 1 Kit, Rfl: 0    lancets (FREESTYLE LANCETS) 28 gauge misc, Use to check blood glucose 2-3 times daily. DX code E16.2, Disp: 100 Lancet, Rfl: 11    OXYCODONE HCL (OXYCODONE PO), Take  by mouth., Disp: , Rfl:     CIPROFLOXACIN HCL (CIPRO PO), Take  by mouth., Disp: , Rfl:     phenazopyridine (PYRIDIUM) 200 mg tablet, Take  by mouth three (3) times daily as needed for Pain., Disp: , Rfl:     propranolol (INDERAL) 10 mg tablet, Take 1 Tab by mouth three (3) times daily. , Disp: 90 Tab, Rfl: 11    traMADol (ULTRAM) 50 mg tablet, Take 50 mg by mouth every six (6) hours as needed for Pain., Disp: , Rfl:     SOCIAL:   Social History     Tobacco Use    Smoking status: Never Smoker    Smokeless tobacco: Never Used   Substance Use Topics    Alcohol use: Yes     Alcohol/week: 0.0 standard drinks     Comment: occasional    Drug use: Never       SURGERIES:   Past Surgical History:   Procedure Laterality Date    HX HYSTERECTOMY  2017    HX OTHER SURGICAL      bladder had to be fixed it was cut during hysterectomy          FAMILY HX:  Family History   Problem Relation Age of Onset    Thyroid Disease Maternal Aunt     Thyroid Disease Maternal Aunt     Hypertension Mother     Hypertension Father          Review of systems:   I personally collected this information from the patient , available records and family members present-[email or web address]  Review of Systems   Constitutional: Positive for fatigue. Negative for appetite change, chills, diaphoresis, fever and unexpected weight change. HENT: Negative for congestion, ear pain, hearing loss, postnasal drip, rhinorrhea, sinus pressure, sinus pain, sneezing, sore throat, tinnitus, trouble swallowing and voice change. Eyes: Negative for photophobia, pain, discharge, itching and visual disturbance. Respiratory: Negative for cough, chest tightness, shortness of breath and wheezing. Cardiovascular: Negative for chest pain, palpitations and leg swelling. Gastrointestinal: Negative for abdominal pain, blood in stool, constipation, diarrhea, nausea and vomiting. Endocrine: Negative for cold intolerance and heat intolerance. Genitourinary: Negative for difficulty urinating, dysuria, frequency, hematuria and urgency. Musculoskeletal: Positive for arthralgias and back pain. Negative for gait problem, myalgias and neck pain. Skin: Negative for color change and rash. Neurological: Negative for dizziness, tremors, syncope, speech difficulty, weakness, numbness and headaches. Hematological: Negative for adenopathy. Does not bruise/bleed easily. Psychiatric/Behavioral: Positive for decreased concentration and dysphoric mood. Negative for sleep disturbance and suicidal ideas. The patient is nervous/anxious. Vitals:   Visit Vitals  BP (!) 134/96 (BP 1 Location: Right arm, BP Patient Position: Sitting)   Pulse 82   Temp 97.3 °F (36.3 °C) (Temporal)   Ht 5' 6\" (1.676 m)   Wt 165 lb 12.8 oz (75.2 kg)   SpO2 100%   BMI 26.76 kg/m²           PHYSICAL:   Physical Exam  Vitals signs reviewed. Constitutional:       General: She is not in acute distress. Appearance: Normal appearance. She is not ill-appearing. HENT:      Right Ear: Tympanic membrane, ear canal and external ear normal.      Left Ear: Tympanic membrane and ear canal normal.      Nose: No congestion. Mouth/Throat:      Mouth: Mucous membranes are moist.      Pharynx: No oropharyngeal exudate or posterior oropharyngeal erythema. Eyes:      General: No scleral icterus. Extraocular Movements: Extraocular movements intact. Pupils: Pupils are equal, round, and reactive to light. Neck:      Musculoskeletal: No neck rigidity or muscular tenderness. Vascular: No carotid bruit. Cardiovascular:      Rate and Rhythm: Normal rate and regular rhythm. Heart sounds: No friction rub. No gallop. Pulmonary:      Effort: Pulmonary effort is normal.      Breath sounds: No wheezing, rhonchi or rales. Abdominal:      General: Bowel sounds are normal. There is no distension. Palpations: Abdomen is soft. There is no mass. Musculoskeletal:         General: Tenderness (mid thoracic about T5-6 left  parascapular and painful ROM) present. No swelling or deformity. Right lower leg: No edema. Left lower leg: No edema. Lymphadenopathy:      Cervical: No cervical adenopathy. Skin:     Coloration: Skin is not jaundiced. Findings: No erythema or rash. Neurological:      General: No focal deficit present. Mental Status: She is alert and oriented to person, place, and time. Cranial Nerves: No cranial nerve deficit. Sensory: No sensory deficit. Gait: Gait (Ambulation status: ) normal.   Psychiatric:         Behavior: Behavior normal.         Judgment: Judgment normal.      Comments: Depressed affect, good eye contact. Behavior , speech are appropriate. ASSESSMENT/PLAN:      1. Hx traumatic fracture      2. Hyperthyroidism    - TSH 3RD GENERATION  - T4, FREE  - T3, REVERSE  - T3 TOTAL    3. Eczema, unspecified type    - CBC WITH AUTOMATED DIFF  - clobetasoL (TEMOVATE) 0.05 % ointment; Apply  to affected area two (2) times a day. Dispense: 45 g; Refill: 2    4. Anxiety    - REFERRAL TO PSYCHIATRY    5. Type 2 diabetes mellitus with hyperglycemia, without long-term current use of insulin (HCC)    - METABOLIC PANEL, COMPREHENSIVE  - HEMOGLOBIN A1C WITH EAG          Discussion:    The patient and I discussed the following items/issues; Safe areas discussed and number given. Each diagnosis listed for today's visit including medications, treatment, testing such as labs, imagine, referrals and when to call regarding results and appointments. Reminded patient to keep any and all appointments with specialists, labs, imaging. Reminded patient to make sure we get copies of any specialists care, labs and imaging. Reminded patient to call of come by the office if there are any concerns, questions , comments or problems.     The patient verbalized understanding of the care plan and all questions were answered to the patient's satisfaction prior to leaving the office. The patient was told that failure to comply with recommended testing could result in abnormal health consequences. The patient was instructed to have yearly routine health maintenance including but not limited to age appropriate vaccines, testing, screening exams. The patient actively participated in medical decision making. FOLLOW UP:   Patient knows to keep any and all future visits scheduled unless told otherwise. Patient knows to call, come back if any concerns, questions, comments or problems arise. This visit may have been completed , in part, with voice recognition software as well as typing and may have syntax errors despite editing.       Patience Archibald, DO

## 2020-10-01 NOTE — PATIENT INSTRUCTIONS
General Health and concerns:  HEART HEALTHY DIET:  A heart healthy diet is one that is low in cholesterol (less than 300 mg daily), fat (less than 80 g daily) . You should also minimize carbohydrates / sugars (less amounts of breads, pastas, potato and potato products and sugary foods/snacks, cookies, cakes, etc) . Try to eat whole wheat/multigrain breads and pastas and eat more vegetables. Cook with olive oil (or no oil) and grill, bake, broil or boil foods. Less red meat and more chicken , fish and lean cuts of beef (limited). 1501-0760 calories per day is sufficient 2362-8229 is acceptable for weight loss. EXERCISE:  You should do exercise 3-5 days per week (minimum) to include increasing your heart rate for 30 to 45 minutes. At least a pace of a brisk walk should do that. This build up your heart and lung endurance and muscles and helps many function of the body. OTHER:        Routine Health maintenance: You need to get a yearly follow up/physical exam to review, discuss age and gender appropriate exams, labs, vaccines and screening tests. This includes cardiovascular health risk, cancer screens and other wild related topics. Medications-take all medications as directed. Please do not stop unless you talk to your doctor or health care provider first. Report any problems immediately. Referrals: if you have been given a referral, please call the office if you do not hear from provider in one week. You may make the appointment yourself. Please keep all appointments with specialists and ask them to send their notes, thoughts, recommendations to us , as your PCP. Imaging/Labs:  Be sure to get these images in a timely manner. IF your test must be scheduled, let us know if you need help getting this done and if you do not hear from that provider in a week , call us or them.   BE SURE to call the office if you do not hear regarding the results in one week after the test is performed Image or lab). It is our intention to inform you of the results ALWAYS, even if normal you should get a notification (Call, portal message). PLEASE ambar if you do not get the results. PLEASE follow all recommendations and call/come in /ask questions if you do not understand of if problems develop after or in between visits. Failure to comply with recommended health care advise could result in serious health consequences. Thank you for choosing our practice and please let us know how we can help you feel better and stay well!

## 2020-10-15 ENCOUNTER — OFFICE VISIT (OUTPATIENT)
Dept: FAMILY MEDICINE CLINIC | Age: 45
End: 2020-10-15
Payer: COMMERCIAL

## 2020-10-15 VITALS
SYSTOLIC BLOOD PRESSURE: 117 MMHG | WEIGHT: 168 LBS | RESPIRATION RATE: 18 BRPM | BODY MASS INDEX: 27 KG/M2 | DIASTOLIC BLOOD PRESSURE: 80 MMHG | HEART RATE: 78 BPM | TEMPERATURE: 97.9 F | HEIGHT: 66 IN | OXYGEN SATURATION: 99 %

## 2020-10-15 DIAGNOSIS — F41.9 ANXIETY: ICD-10-CM

## 2020-10-15 DIAGNOSIS — F32.9 MAJOR DEPRESSIVE DISORDER WITH SINGLE EPISODE, REMISSION STATUS UNSPECIFIED: ICD-10-CM

## 2020-10-15 DIAGNOSIS — L30.9 ECZEMA, UNSPECIFIED TYPE: Primary | ICD-10-CM

## 2020-10-15 DIAGNOSIS — Z87.81 HX TRAUMATIC FRACTURE: ICD-10-CM

## 2020-10-15 DIAGNOSIS — M54.2 NECK PAIN: ICD-10-CM

## 2020-10-15 DIAGNOSIS — E05.90 HYPERTHYROIDISM: ICD-10-CM

## 2020-10-15 PROBLEM — F33.1 DEPRESSION, MAJOR, RECURRENT, MODERATE (HCC): Status: ACTIVE | Noted: 2020-10-15

## 2020-10-15 LAB
ALBUMIN SERPL-MCNC: 4.4 G/DL (ref 3.8–4.8)
ALBUMIN/GLOB SERPL: 1.4 {RATIO} (ref 1.2–2.2)
ALP SERPL-CCNC: 68 IU/L (ref 39–117)
ALT SERPL-CCNC: 9 IU/L (ref 0–32)
AST SERPL-CCNC: 15 IU/L (ref 0–40)
BASOPHILS # BLD AUTO: 0.1 X10E3/UL (ref 0–0.2)
BASOPHILS NFR BLD AUTO: 1 %
BILIRUB SERPL-MCNC: 0.5 MG/DL (ref 0–1.2)
BUN SERPL-MCNC: 9 MG/DL (ref 6–24)
BUN/CREAT SERPL: 17 (ref 9–23)
CALCIUM SERPL-MCNC: 9 MG/DL (ref 8.7–10.2)
CHLORIDE SERPL-SCNC: 103 MMOL/L (ref 96–106)
CO2 SERPL-SCNC: 21 MMOL/L (ref 20–29)
CREAT SERPL-MCNC: 0.53 MG/DL (ref 0.57–1)
EOSINOPHIL # BLD AUTO: 0.1 X10E3/UL (ref 0–0.4)
EOSINOPHIL NFR BLD AUTO: 2 %
ERYTHROCYTE [DISTWIDTH] IN BLOOD BY AUTOMATED COUNT: 13.3 % (ref 11.7–15.4)
EST. AVERAGE GLUCOSE BLD GHB EST-MCNC: 97 MG/DL
GLOBULIN SER CALC-MCNC: 3.2 G/DL (ref 1.5–4.5)
GLUCOSE SERPL-MCNC: 73 MG/DL (ref 65–99)
HBA1C MFR BLD: 5 % (ref 4.8–5.6)
HCT VFR BLD AUTO: 39.2 % (ref 34–46.6)
HGB BLD-MCNC: 12.3 G/DL (ref 11.1–15.9)
IMM GRANULOCYTES # BLD AUTO: 0 X10E3/UL (ref 0–0.1)
IMM GRANULOCYTES NFR BLD AUTO: 0 %
LYMPHOCYTES # BLD AUTO: 2.1 X10E3/UL (ref 0.7–3.1)
LYMPHOCYTES NFR BLD AUTO: 25 %
MCH RBC QN AUTO: 26.6 PG (ref 26.6–33)
MCHC RBC AUTO-ENTMCNC: 31.4 G/DL (ref 31.5–35.7)
MCV RBC AUTO: 85 FL (ref 79–97)
MONOCYTES # BLD AUTO: 0.7 X10E3/UL (ref 0.1–0.9)
MONOCYTES NFR BLD AUTO: 8 %
NEUTROPHILS # BLD AUTO: 5.4 X10E3/UL (ref 1.4–7)
NEUTROPHILS NFR BLD AUTO: 64 %
PLATELET # BLD AUTO: 253 X10E3/UL (ref 150–450)
POTASSIUM SERPL-SCNC: 4.1 MMOL/L (ref 3.5–5.2)
PROT SERPL-MCNC: 7.6 G/DL (ref 6–8.5)
RBC # BLD AUTO: 4.63 X10E6/UL (ref 3.77–5.28)
SODIUM SERPL-SCNC: 138 MMOL/L (ref 134–144)
T3 SERPL-MCNC: 91 NG/DL (ref 71–180)
T3REVERSE SERPL-MCNC: 21.2 NG/DL (ref 9.2–24.1)
T4 FREE SERPL-MCNC: 1.4 NG/DL (ref 0.82–1.77)
TSH SERPL DL<=0.005 MIU/L-ACNC: 0.18 UIU/ML (ref 0.45–4.5)
WBC # BLD AUTO: 8.4 X10E3/UL (ref 3.4–10.8)

## 2020-10-15 PROCEDURE — 99214 OFFICE O/P EST MOD 30 MIN: CPT | Performed by: FAMILY MEDICINE

## 2020-10-15 RX ORDER — SERTRALINE HYDROCHLORIDE 50 MG/1
50 TABLET, FILM COATED ORAL DAILY
Qty: 30 TAB | Refills: 3 | Status: SHIPPED | OUTPATIENT
Start: 2020-10-15 | End: 2020-12-29 | Stop reason: DRUGHIGH

## 2020-10-15 RX ORDER — NAPROXEN 500 MG/1
TABLET ORAL
COMMUNITY
Start: 2020-09-23 | End: 2020-12-29 | Stop reason: ALTCHOICE

## 2020-10-15 RX ORDER — METHOCARBAMOL 750 MG/1
TABLET, FILM COATED ORAL
COMMUNITY
Start: 2020-09-21 | End: 2020-12-29 | Stop reason: ALTCHOICE

## 2020-10-15 RX ORDER — METHIMAZOLE 10 MG/1
10 TABLET ORAL DAILY
Qty: 30 TAB | Refills: 2 | Status: SHIPPED | OUTPATIENT
Start: 2020-10-15 | End: 2021-02-10 | Stop reason: ALTCHOICE

## 2020-10-15 NOTE — PATIENT INSTRUCTIONS
General Health and concerns:  HEART HEALTHY DIET:  A heart healthy diet is one that is low in cholesterol (less than 300 mg daily), fat (less than 80 g daily) . You should also minimize carbohydrates / sugars (less amounts of breads, pastas, potato and potato products and sugary foods/snacks, cookies, cakes, etc) . Try to eat whole wheat/multigrain breads and pastas and eat more vegetables. Cook with olive oil (or no oil) and grill, bake, broil or boil foods. Less red meat and more chicken , fish and lean cuts of beef (limited). 7342-8822 calories per day is sufficient 3130-3554 is acceptable for weight loss. EXERCISE:  You should do exercise 3-5 days per week (minimum) to include increasing your heart rate for 30 to 45 minutes. At least a pace of a brisk walk should do that. This build up your heart and lung endurance and muscles and helps many function of the body. OTHER:        Routine Health maintenance: You need to get a yearly follow up/physical exam to review, discuss age and gender appropriate exams, labs, vaccines and screening tests. This includes cardiovascular health risk, cancer screens and other wild related topics. Medications-take all medications as directed. Please do not stop unless you talk to your doctor or health care provider first. Report any problems immediately. Referrals: if you have been given a referral, please call the office if you do not hear from provider in one week. You may make the appointment yourself. Please keep all appointments with specialists and ask them to send their notes, thoughts, recommendations to us , as your PCP. Imaging/Labs:  Be sure to get these images in a timely manner. IF your test must be scheduled, let us know if you need help getting this done and if you do not hear from that provider in a week , call us or them.   BE SURE to call the office if you do not hear regarding the results in one week after the test is performed Image or lab). It is our intention to inform you of the results ALWAYS, even if normal you should get a notification (Call, portal message). PLEASE ambar if you do not get the results. PLEASE follow all recommendations and call/come in /ask questions if you do not understand of if problems develop after or in between visits. Failure to comply with recommended health care advise could result in serious health consequences. Thank you for choosing our practice and please let us know how we can help you feel better and stay well!

## 2020-10-15 NOTE — PROGRESS NOTES
ID:  Francheska Buckner , 40 y.o., female  CC:   Chief Complaint   Patient presents with    Follow-up     follow up rash, anxiety and back pain    Rash    Anxiety    Back Pain         Patient Active Problem List   Diagnosis Code    Hyperthyroidism E05.90    Graves disease E05.00    Weight loss R63.4    Hx traumatic fracture Z87.81    Eczema L30.9    Anxiety F41.9    Depression, major, recurrent, moderate ([address] F33.1    Major depressive disorder with single episode F32.9       HPI:    Aditi Perdomo is a 40 y.o. female with the above listed medical problems whom comes in today for Follow up and feels like something is not right. NO suicidal ideation but some depression. ALLERGIES:   Allergies   Allergen Reactions    Pcn [Penicillins] Hives and Rash         MEDICATIONS:     Current Outpatient Medications:     methocarbamoL (ROBAXIN) 750 mg tablet, take 1 tablet by mouth three times a day for muscle spasm, Disp: , Rfl:     naproxen (NAPROSYN) 500 mg tablet, take 1 tablet by mouth twice a day, Disp: , Rfl:     clobetasoL (TEMOVATE) 0.05 % ointment, Apply  to affected area two (2) times a day., Disp: 45 g, Rfl: 2    glucose blood VI test strips (FREESTYLE LITE STRIPS) strip, Use to check blood glucose 2-3 times daily. DX code E16.2, Disp: 100 Strip, Rfl: 11    Blood-Glucose Meter (FREESTYLE LITE METER) monitoring kit, Use to check blood glucose 2-3 times daily. DX code E16.2, Disp: 1 Kit, Rfl: 0    lancets (FREESTYLE LANCETS) 28 gauge misc, Use to check blood glucose 2-3 times daily. DX code E16.2, Disp: 100 Lancet, Rfl: 11    Biotin 2,500 mcg cap, Take  by mouth daily. , Disp: , Rfl:     SOCIAL:   Social History     Tobacco Use    Smoking status: Never Smoker    Smokeless tobacco: Never Used   Substance Use Topics    Alcohol use:  Yes     Alcohol/week: 10.0 standard drinks     Types: 10 Glasses of wine per week     Comment: occasional liquor but not weekly     Drug use: Never SURGERIES:   Past Surgical History:   Procedure Laterality Date    HX HYSTERECTOMY  2017    HX OTHER SURGICAL      bladder had to be fixed it was cut during hysterectomy          FAMILY HX:  Family History   Problem Relation Age of Onset    Thyroid Disease Maternal Aunt     Thyroid Disease Maternal Aunt     Hypertension Mother     Hypertension Father          Review of systems:   I personally collected this information from the patient , available records and family members present-[email or web address]  Review of Systems   Constitutional: Positive for fatigue. Negative for activity change, appetite change, chills, diaphoresis, fever and unexpected weight change. HENT: Negative for congestion, ear pain, hearing loss, nosebleeds, postnasal drip, rhinorrhea, sinus pressure, sinus pain, sneezing, sore throat, tinnitus, trouble swallowing and voice change. Eyes: Negative for pain, discharge, itching and visual disturbance. Respiratory: Negative for cough, shortness of breath and wheezing. Cardiovascular: Negative for chest pain, palpitations and leg swelling. Gastrointestinal: Negative for abdominal distention, abdominal pain, blood in stool, constipation, diarrhea, nausea and vomiting. Endocrine: Negative for cold intolerance, heat intolerance, polydipsia, polyphagia and polyuria. Genitourinary: Negative for difficulty urinating, dysuria, frequency and urgency. Musculoskeletal: Positive for back pain and myalgias. Negative for arthralgias, gait problem, joint swelling, neck pain and neck stiffness. Skin: Negative for color change, rash and wound. Neurological: Negative for dizziness, tremors, weakness, light-headedness, numbness and headaches. Hematological: Negative for adenopathy. Does not bruise/bleed easily. Psychiatric/Behavioral: Positive for dysphoric mood. Negative for agitation, decreased concentration, hallucinations, sleep disturbance and suicidal ideas.  The patient is nervous/anxious. Vitals:   Visit Vitals  /80 (BP 1 Location: Left arm, BP Patient Position: Sitting)   Pulse 78   Temp 97.9 °F (36.6 °C) (Oral)   Resp 18   Ht 5' 6\" (1.676 m)   Wt 168 lb (76.2 kg)   SpO2 99%   BMI 27.12 kg/m²           PHYSICAL:   Physical Exam  Nursing note reviewed. Constitutional:       General: She is not in acute distress. Appearance: Normal appearance. She is not ill-appearing or toxic-appearing. HENT:      Right Ear: Tympanic membrane, ear canal and external ear normal.      Left Ear: Tympanic membrane, ear canal and external ear normal.      Nose: No congestion or rhinorrhea. Mouth/Throat:      Pharynx: No oropharyngeal exudate or posterior oropharyngeal erythema. Eyes:      General: No scleral icterus. Extraocular Movements: Extraocular movements intact. Conjunctiva/sclera: Conjunctivae normal.      Pupils: Pupils are equal, round, and reactive to light. Neck:      Musculoskeletal: No neck rigidity or muscular tenderness. Vascular: No carotid bruit. Cardiovascular:      Rate and Rhythm: Normal rate and regular rhythm. Heart sounds: No murmur. No friction rub. No gallop. Pulmonary:      Effort: Pulmonary effort is normal.      Breath sounds: Normal breath sounds. No wheezing, rhonchi or rales. Abdominal:      General: Bowel sounds are normal. There is no distension. Palpations: Abdomen is soft. There is no mass. Tenderness: There is no abdominal tenderness. Musculoskeletal:         General: No swelling, tenderness or deformity. Right lower leg: No edema. Left lower leg: No edema. Comments: Nontender, swelling T3-4-5-6 left paraspinal into the shoulder blade and feels like it is underneath patient also has bilateral L3-4-5 swelling particular on the left. She says that is always like that. It is very painful to sit, stand, walk or bend prolonged periods of time.    Lymphadenopathy:      Cervical: No cervical adenopathy. Skin:     Capillary Refill: Capillary refill takes less than 2 seconds. Coloration: Skin is not jaundiced. Findings: No erythema or rash. Neurological:      General: No focal deficit present. Mental Status: She is alert and oriented to person, place, and time. Mental status is at baseline. Cranial Nerves: No cranial nerve deficit. Sensory: No sensory deficit. Coordination: Coordination normal.      Gait: Gait normal.   Psychiatric:         Mood and Affect: Mood normal.         Behavior: Behavior normal.         Thought Content: Thought content normal.         Judgment: Judgment normal.         ASSESSMENT/PLAN:      1. Eczema, unspecified type      2. Hyperthyroidism  Labs are back and we started the methimazole today. I will also make a referral back to her endocrinologist.  - methIMAzole (TAPAZOLE) 10 mg tablet; Take 1 Tab by mouth daily. Dispense: 30 Tab; Refill: 2  - REFERRAL TO ENDOCRINOLOGY    3. Hx traumatic fracture      4. Anxiety    - sertraline (ZOLOFT) 50 mg tablet; Take 1 Tab by mouth daily. Dispense: 30 Tab; Refill: 3    5. Major depressive disorder with single episode, remission status unspecified    - sertraline (ZOLOFT) 50 mg tablet; Take 1 Tab by mouth daily. Dispense: 30 Tab; Refill: 3    6. Neck pain  Some neck pain. Neurosurgery wanted her to get a spinal film of her cervical spine so we will go and do that and forward results. - XR SPINE CERV 4 OR 5 V; Future          Discussion:    The patient and I discussed the following items/issues;  , She says she feels safe in her relationship  Each diagnosis listed for today's visit including medications, treatment, testing such as labs, imagine, referrals and when to call regarding results and appointments. Reminded patient to keep any and all appointments with specialists, labs, imaging. Reminded patient to make sure we get copies of any specialists care, labs and imaging.    Reminded patient to call of come by the office if there are any concerns, questions , comments or problems. The patient verbalized understanding of the care plan and all questions were answered to the patient's satisfaction prior to leaving the office. The patient was told that failure to comply with recommended testing could result in abnormal health consequences. The patient was instructed to have yearly routine health maintenance including but not limited to age appropriate vaccines, testing, screening exams. The patient actively participated in medical decision making. FOLLOW UP:   Patient knows to keep any and all future visits scheduled unless told otherwise. Patient knows to call, come back if any concerns, questions, comments or problems arise. This visit may have been completed , in part, with voice recognition software as well as typing and may have syntax errors despite editing.       Geraldine Fischer, DO

## 2020-11-04 PROBLEM — R13.10 DYSPHAGIA: Status: ACTIVE | Noted: 2020-11-04

## 2020-11-04 PROBLEM — G47.00 INSOMNIA: Status: ACTIVE | Noted: 2020-11-04

## 2020-11-04 RX ORDER — ZOLPIDEM TARTRATE 5 MG/1
TABLET ORAL
COMMUNITY
End: 2021-02-10 | Stop reason: ALTCHOICE

## 2020-11-04 RX ORDER — CLONAZEPAM 1 MG/1
TABLET ORAL 2 TIMES DAILY
COMMUNITY
End: 2021-02-10 | Stop reason: ALTCHOICE

## 2020-12-29 ENCOUNTER — VIRTUAL VISIT (OUTPATIENT)
Dept: FAMILY MEDICINE CLINIC | Age: 45
End: 2020-12-29
Payer: COMMERCIAL

## 2020-12-29 DIAGNOSIS — M54.2 NECK PAIN: Primary | ICD-10-CM

## 2020-12-29 DIAGNOSIS — M54.6 ACUTE LEFT-SIDED THORACIC BACK PAIN: ICD-10-CM

## 2020-12-29 DIAGNOSIS — F41.9 ANXIETY: ICD-10-CM

## 2020-12-29 DIAGNOSIS — Z87.81 HX TRAUMATIC FRACTURE: ICD-10-CM

## 2020-12-29 DIAGNOSIS — E05.90 HYPERTHYROIDISM: ICD-10-CM

## 2020-12-29 PROCEDURE — 99214 OFFICE O/P EST MOD 30 MIN: CPT | Performed by: FAMILY MEDICINE

## 2020-12-29 RX ORDER — NABUMETONE 500 MG/1
500 TABLET, FILM COATED ORAL 2 TIMES DAILY
Qty: 60 TAB | Refills: 2 | Status: SHIPPED | OUTPATIENT
Start: 2020-12-29 | End: 2021-03-31 | Stop reason: ALTCHOICE

## 2020-12-29 RX ORDER — TIZANIDINE 2 MG/1
TABLET ORAL
Qty: 90 TAB | Refills: 2 | Status: SHIPPED | OUTPATIENT
Start: 2020-12-29 | End: 2021-03-03 | Stop reason: ALTCHOICE

## 2020-12-29 RX ORDER — SERTRALINE HYDROCHLORIDE 100 MG/1
100 TABLET, FILM COATED ORAL DAILY
Qty: 30 TAB | Refills: 3 | Status: SHIPPED | OUTPATIENT
Start: 2020-12-29 | End: 2021-05-06 | Stop reason: SDUPTHER

## 2020-12-29 NOTE — PATIENT INSTRUCTIONS
     
Routine Health maintenance: You need to get a yearly follow up/physical exam to review, discuss age and gender appropriate exams, labs, vaccines and screening tests. This includes cardiovascular health risk, cancer screens and other wild related topics. Medications-take all medications as directed. Please do not stop unless you talk to your doctor or health care provider first. Report any problems immediately. Referrals: if you have been given a referral, please call the office if you do not hear from provider in one week. You may make the appointment yourself. Please keep all appointments with specialists and ask them to send their notes, thoughts, recommendations to us , as your PCP. Imaging/Labs:  Be sure to get these images in a timely manner. IF your test must be scheduled, let us know if you need help getting this done and if you do not hear from that provider in a week , call us or them. BE SURE to call the office if you do not hear regarding the results in one week after the test is performed Image or lab). It is our intention to inform you of the results ALWAYS, even if normal you should get a notification (Call, portal message). PLEASE ambar if you do not get the results. PLEASE follow all recommendations and call/come in /ask questions if you do not understand of if problems develop after or in between visits. Failure to comply with recommended health care advise could result in serious health consequences. You have had a virtual visit today using technology that allows real-time interaction between you and the physician. It does NOT replace personal, face to face , in person visits with a health care provider. Please note that certain diagnoses and advised comes from knowledge of medical conditions and depends HEAVILY upon information you provide the physician. IF there are any concerns or you do not improve you should be seen in person, face to face by a healthcare provider. Thank you for choosing our practice and please let us know how we can help you feel better and stay well!

## 2020-12-29 NOTE — PROGRESS NOTES
IDENTIFICATION:  Rossy Rodriguez 39 y.o. female     This is a video visit performed with doxy. me due to COVID-19 Pandemic. It utilizes video and audio technology that allows real time interaction with the patient. It is documented in [facility]  realizes that this is a billable charge and agrees to proceed. CC (Chief Complaint):  Chief Complaint   Patient presents with   [address] [place] [address] Anxiety         MEDICAL PROBLEM LIST (Past and Current):   Past Medical History:   Diagnosis Date    Dysphagia 11/4/2020    Hx traumatic fracture     Hyperthyroidism     Insomnia 11/4/2020         HPI(History of the Present Illness):  Dusty Valadez is a 39 y.o. female  with the above listed medical problems whom calls in for a virtual visit for upper back pain. She still having thoracic back pain mostly on the left shoulder blade. She does have a upper lumbar compression type fracture. May even be a lower thoracic 1. However, the pain is localizing to the left shoulder blade area. There is some numbness in that area. However, she has no arm or leg pain numbness, burning, tingling, weakness. She still feeling a little anxious and a little emotional about things. She has been on the sertraline 50 mg for about 6 weeks or so. She has no suicidal ideation or homicidal ideation. She has some new life stressors she has mentioned. Otherwise, she has no chest pain or shortness of breath. No abdominal pain. No changes in her bowel or bladder habits. HISTORY:    Social History     Tobacco Use    Smoking status: Never Smoker    Smokeless tobacco: Never Used   Substance Use Topics    Alcohol use:  Yes     Alcohol/week: 10.0 standard drinks     Types: 10 Glasses of wine per week     Frequency: Monthly or less     Drinks per session: 3 or 4     Comment: occasional liquor but not weekly     Drug use: Never     Past Surgical History:   Procedure Laterality Date    HX HYSTERECTOMY  2017    HX OTHER SURGICAL bladder had to be fixed it was cut during hysterectomy     Family History   Problem Relation Age of Onset    Thyroid Disease Maternal Aunt     Thyroid Disease Maternal Aunt     Hypertension Mother     Hypertension Father        ALLERGIES AND MEDICATIONS:  Allergies   Allergen Reactions    Pcn [Penicillins] Hives and Rash     Current Outpatient Medications on File Prior to Visit   Medication Sig Dispense Refill    clonazePAM (KlonoPIN) 1 mg tablet Take  by mouth two (2) times a day.  zolpidem (AMBIEN) 5 mg tablet Take  by mouth nightly as needed for Sleep.  methIMAzole (TAPAZOLE) 10 mg tablet Take 1 Tab by mouth daily. 30 Tab 2    clobetasoL (TEMOVATE) 0.05 % ointment Apply  to affected area two (2) times a day. 45 g 2    glucose blood VI test strips (FREESTYLE LITE STRIPS) strip Use to check blood glucose 2-3 times daily. DX code E16.2 100 Strip 11    Blood-Glucose Meter (FREESTYLE LITE METER) monitoring kit Use to check blood glucose 2-3 times daily. DX code E16.2 1 Kit 0    lancets (FREESTYLE LANCETS) 28 gauge misc Use to check blood glucose 2-3 times daily. DX code E16.2 100 Lancet 11    Biotin 2,500 mcg cap Take  by mouth daily.  [DISCONTINUED] methocarbamoL (ROBAXIN) 750 mg tablet take 1 tablet by mouth three times a day for muscle spasm      [DISCONTINUED] naproxen (NAPROSYN) 500 mg tablet take 1 tablet by mouth twice a day      [DISCONTINUED] sertraline (ZOLOFT) 50 mg tablet Take 1 Tab by mouth daily. 30 Tab 3     No current facility-administered medications on file prior to visit. REVIEW OF SYSTEMS:  Review of Systems   Constitutional: Positive for malaise/fatigue. Negative for chills, diaphoresis, fever and weight loss. HENT: Negative for congestion, ear pain, hearing loss, nosebleeds, sinus pain, sore throat and tinnitus. Eyes: Negative for blurred vision, double vision, photophobia and pain.    Respiratory: Negative for cough, sputum production and shortness of breath. Cardiovascular: Negative for chest pain, palpitations, orthopnea, claudication, leg swelling and PND. Gastrointestinal: Negative for abdominal pain, blood in stool, constipation, diarrhea, heartburn, melena, nausea and vomiting. Genitourinary: Negative for dysuria, frequency and urgency. Musculoskeletal: Positive for back pain and myalgias. Negative for falls, joint pain and neck pain. Skin: Negative for itching and rash. Neurological: Negative for dizziness, tingling, tremors, sensory change, focal weakness and headaches. Psychiatric/Behavioral: Positive for depression. Negative for suicidal ideas. The patient is nervous/anxious. The patient does not have insomnia. VITALS:    NO vitals signs reported by patient from current location    Wt Readings from Last 3 Encounters:   10/15/20 168 lb (76.2 kg)   10/01/20 165 lb 12.8 oz (75.2 kg)   03/26/19 169 lb (76.7 kg)       BP Readings from Last 3 Encounters:   10/15/20 117/80   10/01/20 (!) 134/96   03/26/19 126/87             PHYSICAL:   By  video and virtual technology we observed the following today;  General:  Alert, no acute distress. SKIN: Observable areas are without rashes, lesions, discoloration, jaundice  HEAD: Normocephalic and atraumatic. EYES: EOMI, PERRLA, Sclerae and conjunctivae clear bilaterally  NARES: Patent nares, no discharge  THROAT: Oral mucosa appears to be clear, pink without erythema or exudate  NECK: Trachea is midline, no masses and normal ROM. CHEST: NO tachypnea or respiratory distress. NO asymmetry in respirations or brooke deformities  MSK: Arms FROM  NEURO: NO gross focal motor deficits, tremors, atrophy or asymmetry  PSYCH: Behavior, dress, speech and thought are appropriate. Mood is normal.  NO agitation or depression. ASSESSMENT AND PLAN:  Discussion-  Patient continues to have neck pain and upper shoulder/thoracic pain.   However, I will add different muscle relaxant and nonsteroidal.  She will discontinue the current wounds and she was told specifically what to stop. Not sure that this is related to her fractured vertebrae because it is much higher. However, she will let us know how this goes. I also recommend that she continue her current treatment for anxiety and depression. She did discuss situation with me and she preferred to wait on the psychiatry appointment though the availability is limited due to the fact that is hard to find someone to get her in and this COVID-19 pandemic is also signing a lot of offices down. She will call me if problems develop in the interim. ICD-10-CM ICD-9-CM    1. Neck pain  M54.2 723.1 nabumetone (RELAFEN) 500 mg tablet      tiZANidine (ZANAFLEX) 2 mg tablet   2. Anxiety  F41.9 300.00 sertraline (ZOLOFT) 100 mg tablet   3. Hx traumatic fracture  Z87.81 V15.51 nabumetone (RELAFEN) 500 mg tablet      tiZANidine (ZANAFLEX) 2 mg tablet   4. Hyperthyroidism  E05.90 242.90    5. Acute left-sided thoracic back pain  M54.6 724.1 nabumetone (RELAFEN) 500 mg tablet      tiZANidine (ZANAFLEX) 2 mg tablet         FOLLOW UP:    Patient is advised to keep all future appointments unless otherwise discussed and keep ALL appointments for other providers, specialists and testing if scheduled. Failure to do so could result in adverse health consequences. Follow-up and Dispositions    · Return in about 4 weeks (around 1/26/2021) for Follow up anxiety, depression, thoracic back pain. OTHER DISCUSSION:    The patient and I also discussed the following; That this video visit does not replace nor is as accurate / reliable as a face to face visit with a medical professional/physician/provider and that all diagnoses are based on information given by patient /others representing patient if present and records available.     Each diagnosis listed for today's visit including medications, treatment, testing such as labs, imagine, referrals and when to call regarding results and appointments. Reminded patient to keep any and all appointments with specialists, labs, imaging. Reminded patient to make sure we get copies of any specialists care, labs and imaging. Reminded patient to call of come by the office if there are any concerns, questions , comments or problems. The patient verbalized understanding of the care plan and all questions were answered to the patient's satisfaction prior to leaving the office. The patient was told that failure to comply with recommended testing could result in abnormal health consequences. The patient was instructed to have yearly routine health maintenance including but not limited to age appropriate vaccines, testing, screening exams. The patient actively participated in medical decision making. This visit was completed using manually typed information. There may be errors despite editing. This visit was completed using doxy. me which is audio and video technology that allows real time interaction with the patient.     Imtiaz Ge, DO

## 2021-01-11 ENCOUNTER — TELEPHONE (OUTPATIENT)
Dept: FAMILY MEDICINE CLINIC | Age: 46
End: 2021-01-11

## 2021-01-12 NOTE — TELEPHONE ENCOUNTER
[employer] left message requesting a call. Needs to clairfy evaluation for disability. [phone]. Ref #[id number].

## 2021-01-21 ENCOUNTER — TELEPHONE (OUTPATIENT)
Dept: FAMILY MEDICINE CLINIC | Age: 46
End: 2021-01-21

## 2021-01-21 NOTE — TELEPHONE ENCOUNTER
Pt left message. States that he is out of work due to back injury. That you completed paperwork for intermittent leave. States that employer is saying that they can not support that since it is a full time job,  They are asking that you revise question #2.

## 2021-01-27 NOTE — TELEPHONE ENCOUNTER
Pt left another message. States that employer will not allow her to have intermittent leave it needs to be continuous leave. Asking for referral for PT as well. Somewhere close to her.

## 2021-02-01 ENCOUNTER — TELEPHONE (OUTPATIENT)
Dept: FAMILY MEDICINE CLINIC | Age: 46
End: 2021-02-01

## 2021-02-01 NOTE — TELEPHONE ENCOUNTER
Serenity with [employer] left message. States that they received form and notes from 12/30 visit. Asking that we fax 1/29/2021 visit notes to [phone].

## 2021-02-03 NOTE — TELEPHONE ENCOUNTER
Left Serenity at [employer] a message letting her know that pt was not seen in office on the date they are requesting office notes. That her last visit with us was on 12/29/2020.

## 2021-02-10 ENCOUNTER — OFFICE VISIT (OUTPATIENT)
Dept: FAMILY MEDICINE CLINIC | Age: 46
End: 2021-02-10
Payer: COMMERCIAL

## 2021-02-10 VITALS
HEART RATE: 86 BPM | HEIGHT: 66 IN | DIASTOLIC BLOOD PRESSURE: 80 MMHG | WEIGHT: 174 LBS | RESPIRATION RATE: 18 BRPM | TEMPERATURE: 97.7 F | BODY MASS INDEX: 27.97 KG/M2 | OXYGEN SATURATION: 99 % | SYSTOLIC BLOOD PRESSURE: 129 MMHG

## 2021-02-10 DIAGNOSIS — G89.29 CHRONIC BILATERAL THORACIC BACK PAIN: Primary | ICD-10-CM

## 2021-02-10 DIAGNOSIS — L30.9 ECZEMA, UNSPECIFIED TYPE: ICD-10-CM

## 2021-02-10 DIAGNOSIS — M54.6 CHRONIC BILATERAL THORACIC BACK PAIN: Primary | ICD-10-CM

## 2021-02-10 PROCEDURE — 99213 OFFICE O/P EST LOW 20 MIN: CPT | Performed by: NURSE PRACTITIONER

## 2021-02-10 RX ORDER — ARIPIPRAZOLE 5 MG/1
TABLET ORAL
COMMUNITY
Start: 2021-02-03 | End: 2022-03-21 | Stop reason: ALTCHOICE

## 2021-02-10 RX ORDER — CLOBETASOL PROPIONATE 0.5 MG/G
OINTMENT TOPICAL 2 TIMES DAILY
COMMUNITY
Start: 2020-10-01 | End: 2021-02-10 | Stop reason: SDUPTHER

## 2021-02-10 RX ORDER — CLOBETASOL PROPIONATE 0.5 MG/G
OINTMENT TOPICAL 2 TIMES DAILY
Qty: 15 G | Refills: 3 | Status: SHIPPED | OUTPATIENT
Start: 2021-02-10 | End: 2021-03-31 | Stop reason: SDUPTHER

## 2021-02-10 RX ORDER — DICLOFENAC SODIUM 75 MG/1
TABLET, DELAYED RELEASE ORAL
COMMUNITY
Start: 2021-02-01 | End: 2021-07-23 | Stop reason: ALTCHOICE

## 2021-02-10 RX ORDER — METHIMAZOLE 10 MG/1
10 TABLET ORAL DAILY
COMMUNITY
Start: 2020-10-15 | End: 2021-03-11 | Stop reason: SDUPTHER
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
## 2021-02-12 ENCOUNTER — VIRTUAL VISIT (OUTPATIENT)
Dept: ENDOCRINOLOGY | Age: 46
End: 2021-02-12
Payer: COMMERCIAL

## 2021-02-12 DIAGNOSIS — E05.90 HYPERTHYROIDISM: Primary | ICD-10-CM

## 2021-02-12 DIAGNOSIS — E53.8 VITAMIN B12 DEFICIENCY: ICD-10-CM

## 2021-02-12 DIAGNOSIS — E55.9 VITAMIN D DEFICIENCY: ICD-10-CM

## 2021-02-12 DIAGNOSIS — E05.00 GRAVES DISEASE: ICD-10-CM

## 2021-02-12 PROCEDURE — 99214 OFFICE O/P EST MOD 30 MIN: CPT | Performed by: INTERNAL MEDICINE

## 2021-02-12 NOTE — PROGRESS NOTES
Rachel Duenas is a 39 y.o. female here for   Chief Complaint   Patient presents with    Thyroid Problem       1. Have you been to the ER, urgent care clinic since your last visit? Hospitalized since your last visit? -[address] [place] [address] in Oct for back pain/ fx    2. Have you seen or consulted any other health care providers outside of the [address] [place] [address] since your last visit?   Include any pap smears or colon screening.-psychiatrist and neurosurgeon

## 2021-02-12 NOTE — PROGRESS NOTES
Vania Mejia MD          Patient Information  Date:2/12/2021  Name : Abby Hernandez 39 y.o.     YOB: 1975         Referred by: Alfredo Odonnell DO         Chief Complaint   Patient presents with    Thyroid Problem     Pursuant to the emergency declaration under the [facility] [place] [address] [facility] authority and the [employer] Act, this Virtual  Visit was conducted, with patient's consent, to reduce the patient's risk of exposure to COVID-19 . Patient  is aware that this is a billable encounter and is responsible for copays/deductibles       Services were provided through a video synchronous discussion virtually to substitute for in-person clinic visit. Place of service: Provider : Office  Patient: Home  History of present illness    Abby Hernandez is a 39 y.o. female  here for fu of thyroid. She was seen in 2017, was on methimazole. Lost insurance, off medications, restarted methimazole in March 2019 which she took it for less than 6 months, did not follow-up and self stopped  In October 2020 she presented to PCP with racing of the heart, nervousness, labs were suggestive of hyperthyroidism, she was restarted on 10 mg of methimazole  Complains of weight fluctuation, tachycardia, racing of the heart, nervousness intermittently  No recent thyroid function test    She had anemia in the past, status post hysterectomy in 2018      Prior hx       She was diagnosed with hyperthyroidism/Graves disease 10 years ago, intermittently on PTU, reports fluctuating weight. She attributed hair loss to PTU and hence stopped it. She had stopped antithyroid medications in the past thinking that is the cause for hair loss             FH of thyroid disease.     Wt Readings from Last 3 Encounters:   02/10/21 174 lb (78.9 kg)   10/15/20 168 lb (76.2 kg)   10/01/20 165 lb 12.8 oz (75.2 kg)       Past Medical History:   Diagnosis Date    Dysphagia 11/4/2020    Hx traumatic fracture     Hyperthyroidism     Insomnia 11/4/2020       Current Outpatient Medications   Medication Sig    ARIPiprazole (ABILIFY) 5 mg tablet     diclofenac EC (VOLTAREN) 75 mg EC tablet     methIMAzole (TAPAZOLE) 10 mg tablet Take 10 mg by mouth daily.  clobetasoL (TEMOVATE) 0.05 % ointment Apply  to affected area two (2) times a day.  nabumetone (RELAFEN) 500 mg tablet Take 1 Tab by mouth two (2) times a day.  sertraline (ZOLOFT) 100 mg tablet Take 1 Tab by mouth daily. NEW Dose    tiZANidine (ZANAFLEX) 2 mg tablet One tablet tid     No current facility-administered medications for this visit. Review of Systems:  - Per HPI    Physical Examination:  - There were no vitals taken for this visit. There is no height or weight on file to calculate BMI. - General: pleasant, no distress, good eye contact  - HEENT: no exophthalmos, no periorbital edema, EOMI  - Neck: No visible thyromegaly  - RS: Normal respiratory effort  - Musculoskeletal: no tremors  - Neurological: alert and oriented  - Psychiatric: normal mood and affect  - Skin: Normal color    Data Reviewed:   Lab Results   Component Value Date/Time    TSH 0.177 (L) 10/09/2020 10:53 AM    T4, Free 1.40 10/09/2020 10:53 AM      [] Reviewed labs    Assessment/Plan:     1. Hyperthyroidism    2. Graves disease      Grave's disease. She has intermittently been taking methimazole.   Discussed the need for methimazole at least 18 months, risk of untreated hyperthyroidism in the long-term  She has been on 10 mg since October 2020, need thyroid function test.  Discussed the importance of monitoring the blood glucose, clinical exam and adjusting the methimazole    Recheck labs, rule out anemia  Check vitamin B12  Avoid skipping meals, avoid artificial sweeteners    Palpitations: Work-up as above    Vitamin D deficiency        Thank you for allowing me to participate in the care of this patient.     Wyatt Miguel MD      Patient verbalized understanding

## 2021-02-19 ENCOUNTER — HOSPITAL ENCOUNTER (OUTPATIENT)
Dept: PHYSICAL THERAPY | Age: 46
Discharge: HOME | End: 2021-02-19
Payer: COMMERCIAL

## 2021-02-19 ENCOUNTER — LAB ONLY (OUTPATIENT)
Dept: ENDOCRINOLOGY | Age: 46
End: 2021-02-19

## 2021-02-19 DIAGNOSIS — E55.9 VITAMIN D DEFICIENCY: ICD-10-CM

## 2021-02-19 DIAGNOSIS — E05.00 GRAVES DISEASE: ICD-10-CM

## 2021-02-19 DIAGNOSIS — E53.8 VITAMIN B12 DEFICIENCY: ICD-10-CM

## 2021-02-19 DIAGNOSIS — E05.90 HYPERTHYROIDISM: ICD-10-CM

## 2021-02-19 PROCEDURE — 97161 PT EVAL LOW COMPLEX 20 MIN: CPT
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
## 2021-02-20 LAB
25(OH)D3 SERPL-MCNC: 10.9 NG/ML (ref 30–100)
COMMENT, HOLDF: NORMAL
SAMPLES BEING HELD,HOLD: NORMAL
T4 FREE SERPL-MCNC: 0.9 NG/DL (ref 0.8–1.5)
TSH SERPL DL<=0.05 MIU/L-ACNC: 0.41 UIU/ML (ref 0.36–3.74)
VIT B12 SERPL-MCNC: 607 PG/ML (ref 193–986)

## 2021-02-22 ENCOUNTER — TELEPHONE (OUTPATIENT)
Dept: ENDOCRINOLOGY | Age: 46
End: 2021-02-22

## 2021-02-22 NOTE — TELEPHONE ENCOUNTER
----- Message from Michael Lyon MD sent at 2/20/2021  8:51 PM EST -----  Low vit D , recommend vitamin D 2000 units daily    low Vit D can cause fatigue

## 2021-02-22 NOTE — TELEPHONE ENCOUNTER
Per Dr. John Lawrence, informed pt of result note, as noted above. Pt verbalized understanding with no further questions or concerns at this time.

## 2021-02-23 ENCOUNTER — HOSPITAL ENCOUNTER (OUTPATIENT)
Dept: MRI IMAGING | Age: 46
Discharge: HOME | End: 2021-02-23
Attending: NURSE PRACTITIONER
Payer: COMMERCIAL

## 2021-02-23 ENCOUNTER — TELEPHONE (OUTPATIENT)
Dept: FAMILY MEDICINE CLINIC | Age: 46
End: 2021-02-23

## 2021-02-23 DIAGNOSIS — M54.6 CHRONIC BILATERAL THORACIC BACK PAIN: ICD-10-CM

## 2021-02-23 DIAGNOSIS — G89.29 CHRONIC BILATERAL THORACIC BACK PAIN: ICD-10-CM

## 2021-02-23 PROCEDURE — 72146 MRI CHEST SPINE W/O DYE: CPT

## 2021-02-23 NOTE — TELEPHONE ENCOUNTER
Serenity with [employer] left message. Asking for stanley fication for pt to return to work?     Saw that pt was seen by Liang Bean on 2/10/21 and notes say that pt needs further PT.

## 2021-02-24 DIAGNOSIS — G89.29 CHRONIC BILATERAL THORACIC BACK PAIN: Primary | ICD-10-CM

## 2021-02-24 DIAGNOSIS — M54.6 CHRONIC BILATERAL THORACIC BACK PAIN: Primary | ICD-10-CM

## 2021-02-24 NOTE — PROGRESS NOTES
Identifying Data:  39 y.o. , [place] , female     Historical Data Reviewed ; Allergies-  Allergies   Allergen Reactions    Pcn [Penicillins] Hives and Rash     Current medication as of last visit and reconciliation-  Current Outpatient Medications on File Prior to Visit   Medication Sig Dispense Refill    ARIPiprazole (ABILIFY) 5 mg tablet       diclofenac EC (VOLTAREN) 75 mg EC tablet       methIMAzole (TAPAZOLE) 10 mg tablet Take 10 mg by mouth daily.  clobetasoL (TEMOVATE) 0.05 % ointment Apply  to affected area two (2) times a day. 15 g 3    nabumetone (RELAFEN) 500 mg tablet Take 1 Tab by mouth two (2) times a day. 60 Tab 2    tiZANidine (ZANAFLEX) 2 mg tablet One tablet tid 90 Tab 2    sertraline (ZOLOFT) 100 mg tablet Take 1 Tab by mouth daily. NEW Dose 30 Tab 3     No current facility-administered medications on file prior to visit. Past Medical History-  Patient Active Problem List   Diagnosis Code    Hyperthyroidism E05.90    Graves disease E05.00    Weight loss R63.4    Hx traumatic fracture Z87.81    Eczema L30.9    Anxiety F41.9    Depression, major, recurrent, moderate (HCC) F33.1    Major depressive disorder with single episode F32.9    Dysphagia R13.10    Insomnia G47.00       Assessment and Plan:    ICD-10-CM ICD-9-CM    1.  Chronic bilateral thoracic back pain  M54.6 724.1 REFERRAL TO ORTHOPEDIC SURGERY    G89.29 338.29            Memory Abraham, DO

## 2021-02-26 NOTE — PROGRESS NOTES
I  do attest that this note was reviewed and I was available for  TEX MONSALVE in this day of service and will follow along with TEX Duncan.      Miguel Crockett, DO

## 2021-03-03 ENCOUNTER — OFFICE VISIT (OUTPATIENT)
Dept: FAMILY MEDICINE CLINIC | Age: 46
End: 2021-03-03
Payer: COMMERCIAL

## 2021-03-03 VITALS
BODY MASS INDEX: 28.12 KG/M2 | HEART RATE: 75 BPM | SYSTOLIC BLOOD PRESSURE: 116 MMHG | OXYGEN SATURATION: 99 % | DIASTOLIC BLOOD PRESSURE: 74 MMHG | TEMPERATURE: 97.8 F | WEIGHT: 175 LBS | HEIGHT: 66 IN

## 2021-03-03 DIAGNOSIS — F32.9 MAJOR DEPRESSIVE DISORDER WITH SINGLE EPISODE, REMISSION STATUS UNSPECIFIED: ICD-10-CM

## 2021-03-03 DIAGNOSIS — M51.9 THORACIC DISC DISEASE: ICD-10-CM

## 2021-03-03 DIAGNOSIS — M54.2 NECK PAIN: ICD-10-CM

## 2021-03-03 DIAGNOSIS — M54.6 CHRONIC BILATERAL THORACIC BACK PAIN: Primary | ICD-10-CM

## 2021-03-03 DIAGNOSIS — G89.29 CHRONIC BILATERAL THORACIC BACK PAIN: Primary | ICD-10-CM

## 2021-03-03 PROCEDURE — 99214 OFFICE O/P EST MOD 30 MIN: CPT | Performed by: FAMILY MEDICINE

## 2021-03-03 RX ORDER — METHOCARBAMOL 500 MG/1
500 TABLET, FILM COATED ORAL 4 TIMES DAILY
Qty: 120 TAB | Refills: 0 | Status: SHIPPED | OUTPATIENT
Start: 2021-03-03 | End: 2021-05-12 | Stop reason: ALTCHOICE

## 2021-03-03 NOTE — PROGRESS NOTES
IDENTIFYING INFORMATION:  Rossy Rodriguez , 45 y.o., female  [address] [place] [address]     CHIEF COMPLAINT:   Chief Complaint   Patient presents with   • Follow-up     Had MRI done, has not gotten results yet. C/o pain being worse.      HISTORY OF PRESENT ILLNESS:  Rossy Rodriguez is a 45 y.o. female with the below listed past medical history and comes in to the office today for back pain , bilateral thoracic and left is worse. Has some numbness, tingling and lightening like shocks in hands, shoulders, left worse, chest pain from top of shoulder to left upper chest.  Has been getting pt.  Wondering if neck pain.  Arms do feel weak.  MRI done on 2-23-21 of T spine shows left lateral protrusion T1-2 and NO cord compression or stenosis.      ALLERGIES:   Allergies   Allergen Reactions   • Pcn [Penicillins] Hives and Rash       MEDICATIONS:   Current Outpatient Medications on File Prior to Visit   Medication Sig Dispense Refill   • cholecalciferol, vitamin D3, (VITAMIN D3 PO) Take 2,000 Units by mouth daily.     • ARIPiprazole (ABILIFY) 5 mg tablet      • diclofenac EC (VOLTAREN) 75 mg EC tablet      • methIMAzole (TAPAZOLE) 10 mg tablet Take 10 mg by mouth daily.     • clobetasoL (TEMOVATE) 0.05 % ointment Apply  to affected area two (2) times a day. 15 g 3   • nabumetone (RELAFEN) 500 mg tablet Take 1 Tab by mouth two (2) times a day. 60 Tab 2   • tiZANidine (ZANAFLEX) 2 mg tablet One tablet tid 90 Tab 2   • sertraline (ZOLOFT) 100 mg tablet Take 1 Tab by mouth daily. NEW Dose 30 Tab 3     No current facility-administered medications on file prior to visit.        PAST MEDICAL HISTORY:  Past Medical History:   Diagnosis Date   • Dysphagia 11/4/2020   • Hx traumatic fracture    • Hyperthyroidism    • Insomnia 11/4/2020       SOCIAL HISTORY:   Social History     Tobacco Use   • Smoking status: Never Smoker   • Smokeless tobacco: Never Used   Substance Use Topics   • Alcohol use: Yes     Alcohol/week:  10.0 standard drinks     Types: 10 Glasses of wine per week     Frequency: Monthly or less     Drinks per session: 3 or 4     Comment: occasional liquor but not weekly     Drug use: Never       SURGICAL HISTORY:  Past Surgical History:   Procedure Laterality Date    HX HYSTERECTOMY  2017    HX OTHER SURGICAL      bladder had to be fixed it was cut during hysterectomy        FAMILY HISTORY:  Family History   Problem Relation Age of Onset    Thyroid Disease Maternal Aunt     Thyroid Disease Maternal Aunt     Hypertension Mother     Hypertension Father          REVIEW OF SYSTEMS:  I personally collected this information from all available source present (patient/others in room and records available) -[email or web address]    Review of Systems   Constitutional: Negative for activity change, appetite change, chills, diaphoresis, fever and unexpected weight change. HENT: Negative for congestion, ear discharge, ear pain, hearing loss, rhinorrhea, sinus pressure, sneezing, sore throat, tinnitus, trouble swallowing and voice change. Eyes: Negative for photophobia, pain, discharge and visual disturbance. Respiratory: Negative for cough, chest tightness, shortness of breath and wheezing. Cardiovascular: Negative for chest pain, palpitations and leg swelling. Gastrointestinal: Negative for abdominal distention, abdominal pain, blood in stool, constipation, diarrhea, nausea and vomiting. Endocrine: Negative for cold intolerance, heat intolerance, polydipsia and polyphagia. Genitourinary: Negative for difficulty urinating, dysuria, frequency, hematuria and urgency. Musculoskeletal: Positive for back pain, gait problem and myalgias. Negative for arthralgias, joint swelling and neck pain. Skin: Negative for color change and rash. Neurological: Negative for dizziness, tremors, syncope, speech difficulty, weakness, light-headedness, numbness and headaches. Hematological: Negative for adenopathy.  Does not bruise/bleed easily. Psychiatric/Behavioral: Negative for agitation, behavioral problems, confusion, decreased concentration, dysphoric mood, hallucinations, sleep disturbance and suicidal ideas. The patient is not nervous/anxious. PHYSICAL EXAMINATION:  Vital Signs:    Visit Vitals  /74 (BP 1 Location: Left upper arm, BP Patient Position: Sitting)   Pulse 75   Temp 97.8 °F (36.6 °C) (Temporal)   Ht 5' 6\" (1.676 m)   Wt 175 lb (79.4 kg)   SpO2 99%   BMI 28.25 kg/m²         Wt Readings from Last 3 Encounters:   03/03/21 175 lb (79.4 kg)   02/10/21 174 lb (78.9 kg)   10/15/20 168 lb (76.2 kg)     BP Readings from Last 3 Encounters:   03/03/21 116/74   02/10/21 129/80   10/15/20 117/80         Physical Exam  Nursing note reviewed. Constitutional:       General: She is not in acute distress. Appearance: Normal appearance. She is not ill-appearing or toxic-appearing. Eyes:      General: No scleral icterus. Extraocular Movements: Extraocular movements intact. Conjunctiva/sclera: Conjunctivae normal.      Pupils: Pupils are equal, round, and reactive to light. Neck:      Musculoskeletal: No neck rigidity or muscular tenderness. Vascular: No carotid bruit. Cardiovascular:      Rate and Rhythm: Normal rate and regular rhythm. Heart sounds: No murmur. No friction rub. No gallop. Pulmonary:      Effort: Pulmonary effort is normal.      Breath sounds: Normal breath sounds. No wheezing, rhonchi or rales. Abdominal:      General: Bowel sounds are normal. There is no distension. Palpations: Abdomen is soft. There is no mass. Tenderness: There is no abdominal tenderness. Musculoskeletal:         General: No swelling, tenderness or deformity. Right lower leg: No edema. Left lower leg: No edema. Comments: Cervical spine: Inspection-reveals no gross deformities. Cervical lordosis is normal.  Palpation-reveals no tender muscle knots or spasms.   Range of motion-is normal all planes. Thoracic spine-inspection reveals a little bit of fullness on the left but there is no curvature is noted. Palpation-reveals tenderness mid trapezius belly and tendon 1-2-3 left paraspinals and left parascapular. Range of motion of thoracic spine is painful in sidebending and rotation to the right and left. Shoulder: Inspection is without gross deformities. Palpation-reveals normal muscle tension without the tender knots or bursal/tenderness tender points. Range of motion-is normal.  Lumbar spine: Inspection reveals no scarring or gross deformities. Lordosis is normal.  Palpation-reveals some tightness in the L2-3 bilateral paraspinal.  There is also a large tender muscle knot in that side and group. Range of motion-painful flexion and sidebending right. Extension is also painful but normal.   Lymphadenopathy:      Cervical: No cervical adenopathy. Skin:     Capillary Refill: Capillary refill takes less than 2 seconds. Coloration: Skin is not jaundiced. Findings: No erythema or rash. Neurological:      General: No focal deficit present. Mental Status: She is alert and oriented to person, place, and time. Mental status is at baseline. Cranial Nerves: No cranial nerve deficit. Sensory: No sensory deficit. Motor: No weakness. Coordination: Coordination normal.      Gait: Gait normal.      Deep Tendon Reflexes: Reflexes normal.   Psychiatric:         Mood and Affect: Mood normal.         Behavior: Behavior normal.         Thought Content: Thought content normal.         Judgment: Judgment normal.         ASSESSMENT/PLAN:    Discussion (regarding today's visit with Nicol Cavazos); She definitely has symptoms and signs consistent with a disc in the upper thorax. The MRI is certainly static exam and I think sidebending and rotation to the left cause worsening symptoms that I could get no radiation of the pain. Neurologically she was intact. I do think she should see a neurologist.  Also, think she should continue to remain off work especially in light of these new findings. ICD-10-CM ICD-9-CM    1. Chronic bilateral thoracic back pain  M54.6 724.1 methocarbamoL (ROBAXIN) 500 mg tablet    G89.29 338.29 REFERRAL TO ORTHOPEDIC SURGERY   2. Major depressive disorder with single episode, remission status unspecified  F32.9 296.20    3. Neck pain  M54.2 723.1    4. Thoracic disc disease  M51.9 722.92 methocarbamoL (ROBAXIN) 500 mg tablet      REFERRAL TO ORTHOPEDIC SURGERY     WE reviewed each diagnosis listed for today's visit including medications, treatment, testing such as labs, imagine, referrals and when to call regarding results and appointments. Reminded patient to keep any and all appointments with specialists, labs, imaging. Reminded patient to make sure we get copies of any specialists care, labs and imaging. Reminded patient to call of come by the office if there are any concerns, questions , comments or problems. The patient verbalized understanding of the care plan and all questions were answered to the patient's satisfaction prior to leaving the office. The patient was told that failure to comply with recommended testing could result in abnormal health consequences. The patient was instructed to have yearly routine health maintenance including but not limited to age appropriate vaccines, testing, screening exams. ALL questions were answered to her satisfaction before leaving the office. The patient actively participated in medical decision making. FOLLOW UP:   Patient knows to keep any and all future visits scheduled unless told otherwise. Patient knows to call, come back if any concerns, questions, comments or problems arise. Follow-up and Dispositions    · Return in about 4 weeks (around 3/31/2021) for back pain.            This visit may have been completed , in part, with voice recognition software as well as typing and may have syntax errors despite editing.       Prince Reed, DO

## 2021-03-03 NOTE — PATIENT INSTRUCTIONS
     
Routine Health maintenance: You need to get a yearly follow up/physical exam to review, discuss age and gender appropriate exams, labs, vaccines and screening tests. This includes cardiovascular health risk, cancer screens and other wild related topics. Medications-Take all medications as directed. Please do not stop unless you talk to your doctor or health care provider first. Report any problems immediately. Referrals: if you have been given a referral, please call the office if you do not hear from provider in one week. You may make the appointment yourself. Please keep all appointments with specialists and ask them to send their notes, thoughts, recommendations to us , as your PCP. Imaging/Labs:  Be sure to get these images in a timely manner. IF your test must be scheduled, let us know if you need help getting this done and if you do not hear from that provider in a week , call us or them. BE SURE to call the office if you do not hear regarding the results in one week after the test is performed Image or lab). It is our intention to inform you of the results ALWAYS, even if normal you should get a notification (Call, portal message). PLEASE ambar if you do not get the results. PLEASE follow all recommendations and call/come in /ask questions if you do not understand of if problems develop after or in between visits. Failure to comply with recommended health care advise could result in serious health consequences. Thank you for choosing our practice and please let us know how we can help you feel better and stay well!

## 2021-03-03 NOTE — PROGRESS NOTES
1. Have you been to the ER, urgent care clinic since your last visit? Hospitalized since your last visit? No    2. Have you seen or consulted any other health care providers outside of the [address] [place] [address] since your last visit? Include any pap smears or colon screening. Has seen psych in [facility] Complaint   Patient presents with    Follow-up     Had MRI done, has not gotten results yet. C/o pain being worse.         Visit Vitals  /74 (BP 1 Location: Left upper arm, BP Patient Position: Sitting)   Pulse 75   Temp 97.8 °F (36.6 °C) (Temporal)   Ht 5' 6\" (1.676 m)   Wt 175 lb (79.4 kg)   SpO2 99%   BMI 28.25 kg/m²

## 2021-03-11 DIAGNOSIS — E05.00 GRAVES DISEASE: ICD-10-CM

## 2021-03-11 DIAGNOSIS — E05.90 HYPERTHYROIDISM: Primary | ICD-10-CM

## 2021-03-11 RX ORDER — METHIMAZOLE 5 MG/1
5 TABLET ORAL DAILY
Qty: 90 TAB | Refills: 1 | Status: SHIPPED | OUTPATIENT
Start: 2021-03-11 | End: 2021-12-21

## 2021-03-11 NOTE — TELEPHONE ENCOUNTER
----- Message from Ankita Pandey sent at 3/11/2021  8:41 AM EST -----  Regarding: Dr. Alvarenga Pel: [phone]  General Message/Vendor Calls    Caller's first and last name:Pt      Reason for call: Patient requests a call back  to know if Dr. Arnol Rodrigues would like for her to continue taking \"methimazole 10mg\". If yes, patient requests a refill request being sent to her pharmacy to [facility] at   [phone]  Fax:  [phone]       Callback required yes/no and why:Yes, confirm.        Best contact number(s):[phone]      Details to clarify the request:n/a      Ankita Pandey

## 2021-03-11 NOTE — TELEPHONE ENCOUNTER
Informed pt that rx was changed to 5 mg and sent in. Pt verbalized understanding with no further questions or concerns at this time.

## 2021-03-12 ENCOUNTER — HOSPITAL ENCOUNTER (OUTPATIENT)
Dept: PHYSICAL THERAPY | Age: 46
Discharge: HOME | End: 2021-03-12
Payer: COMMERCIAL

## 2021-03-12 PROCEDURE — 97140 MANUAL THERAPY 1/> REGIONS: CPT

## 2021-03-12 PROCEDURE — 97110 THERAPEUTIC EXERCISES: CPT

## 2021-03-12 NOTE — PROGRESS NOTES
PT DAILY TREATMENT NOTE - [facility] 2-15    Patient Name: Dafne Lopez  Date:3/12/2021  : 1975  [x]  Patient  Verified  Payor: [address] [place] [address] Road / Plan: [address] / Product Type: Managed Care Medicaid /    In time: 3352 am  Out time:1125 am  Total Treatment Time (min): 50  Total Timed Codes (min): 45  1:1 Treatment Time ( [address] only): 45   Visit #:  2    Treatment Area: Pain in thoracic spine [M54.6]  Other chronic pain [G89.29]    SUBJECTIVE  Pain Level (0-10 scale): 9 (neck)   5 (mid-back)  Any medication changes, allergies to medications, adverse drug reactions, diagnosis change, or new procedure performed?: [x] No    [] Yes (see summary sheet for update)  Subjective functional status/changes:   [] No changes reported  Had MRI which showed arthritis and DDD in neck, bulging discs in thoracic spine, and arthritis and DDD in lumbar spine. She is using a steroid dose pack but hasn't noticed a difference yet. Next step is injections and last resort is surgery. OBJECTIVE    Modality rationale: decrease inflammation, decrease pain and increase tissue extensibility to improve the patients ability to complete ADLS and function with less pain.    Min Type Additional Details       [] Estim: []Att   []Unatt    []TENS instruct                  []IFC  []Premod   []NMES                     []Other:  []w/US   []w/ice   []w/heat  Position:  Location:       []  Traction: [] Cervical       []Lumbar                       [] Prone          []Supine                       []Intermittent   []Continuous Lbs:  [] before manual  [] after manual  []w/heat    []  Ultrasound: []Continuous   [] Pulsed                       at: []1MHz   []3MHz Location:  W/cm2:         []  Ice     []  Heat  []  Ice massage Position:  Location:                 [x] Skin assessment post-treatment:  [x]intact []redness- no adverse reaction    []redness  adverse reaction:     35 min Therapeutic Exercise:  [x] See flow sheet : Rationale: increase ROM and increase strength to improve the patients ability to complete ADLS and function with less pain. 10 min Manual Therapy: seated traps/cervical paraspinals    Rationale: decrease pain, increase ROM, increase tissue extensibility and decrease trigger points to improve the patients ability to complete ADLS and function with less pain. With   [x] TE   [] TA   [] Neuro   [] SC   [x] other: Patient Education: [x] Review HEP    [] Progressed/Changed HEP based on:   [] positioning   [] body mechanics   [] transfers   [] heat/ice application    [x] other: diagnosis     Other Objective/Functional Measures: tightness to palpation B upper traps     Pain Level (0-10 scale) post treatment: 4    ASSESSMENT/Changes in Function:   Patient did well with exercises in clinic. Understands posture and how exercise will affect her pain level. Less pain after exercise and manual therapy. Patient will continue to benefit from skilled PT services to modify and progress therapeutic interventions, address ROM deficits, address strength deficits and analyze and modify body mechanics/ergonomics to attain remaining goals. [x]  See Plan of Care  []  See progress note/recertification  []  See Discharge Summary         Progress towards goals / Updated goals:  Short Term Goals: To be accomplished in 4 weeks:              1.  Independent with HEP. 2.  Able to sit for 1 hour without increased pain. Long Term Goals: To be accomplished in 12 weeks:              1.  Able to sleep 6 hours without waking due to pain. 2.  Sit to stand without UE assist.              3.  Able to walk up stairs without pain.     PLAN  []  Upgrade activities as tolerated     [x]  Continue plan of care  []  Update interventions per flow sheet       []  Discharge due to:_  []  Other:_      Meme Mueller, PT 3/12/2021

## 2021-03-19 ENCOUNTER — OFFICE VISIT (OUTPATIENT)
Dept: OBGYN CLINIC | Age: 46
End: 2021-03-19
Payer: COMMERCIAL

## 2021-03-19 ENCOUNTER — HOSPITAL ENCOUNTER (OUTPATIENT)
Dept: PHYSICAL THERAPY | Age: 46
Discharge: HOME | End: 2021-03-19
Payer: COMMERCIAL

## 2021-03-19 VITALS
DIASTOLIC BLOOD PRESSURE: 80 MMHG | HEIGHT: 66 IN | BODY MASS INDEX: 27.97 KG/M2 | WEIGHT: 174 LBS | RESPIRATION RATE: 16 BRPM | SYSTOLIC BLOOD PRESSURE: 122 MMHG

## 2021-03-19 DIAGNOSIS — Z01.419 ROUTINE GYNECOLOGICAL EXAMINATION: Primary | ICD-10-CM

## 2021-03-19 PROCEDURE — 97110 THERAPEUTIC EXERCISES: CPT

## 2021-03-19 PROCEDURE — 97140 MANUAL THERAPY 1/> REGIONS: CPT

## 2021-03-19 PROCEDURE — 99396 PREV VISIT EST AGE 40-64: CPT | Performed by: OBSTETRICS & GYNECOLOGY

## 2021-03-19 NOTE — PROGRESS NOTES
HISTORY OF PRESENT ILLNESS  Helio Vásquez is a 39 y.o. female who presents today for the following:  Chief Complaint   Patient presents with    Annual Exam   Is a 42-year-old G2, P5 female who presents today for routine annual exam.  She complains of stress incontinence on presentation today. No obstetric history on file. Allergies   Allergen Reactions    Pcn [Penicillins] Hives and Rash       Current Outpatient Medications   Medication Sig    methIMAzole (TAPAZOLE) 5 mg tablet Take 1 Tab by mouth daily.  cholecalciferol, vitamin D3, (VITAMIN D3 PO) Take 2,000 Units by mouth daily.  methocarbamoL (ROBAXIN) 500 mg tablet Take 1 Tab by mouth four (4) times daily.  ARIPiprazole (ABILIFY) 5 mg tablet     diclofenac EC (VOLTAREN) 75 mg EC tablet     clobetasoL (TEMOVATE) 0.05 % ointment Apply  to affected area two (2) times a day.  nabumetone (RELAFEN) 500 mg tablet Take 1 Tab by mouth two (2) times a day.  sertraline (ZOLOFT) 100 mg tablet Take 1 Tab by mouth daily. NEW Dose     No current facility-administered medications for this visit.         Past Medical History:   Diagnosis Date    Dysphagia 11/4/2020    Hx traumatic fracture     Hyperthyroidism     Insomnia 11/4/2020       Past Surgical History:   Procedure Laterality Date    HX HYSTERECTOMY  2017    HX OTHER SURGICAL      bladder had to be fixed it was cut during hysterectomy       Family History   Problem Relation Age of Onset    Thyroid Disease Maternal Aunt     Thyroid Disease Maternal Aunt     Hypertension Mother     Hypertension Father        Social History     Socioeconomic History    Marital status:      Spouse name: Not on file    Number of children: Not on file    Years of education: Not on file    Highest education level: Not on file   Occupational History    Not on file   Social Needs    Financial resource strain: Not on file    Food insecurity     Worry: Not on file     Inability: Not on file   Noah Favorite Transportation needs     Medical: Not on file     Non-medical: Not on file   Tobacco Use    Smoking status: Never Smoker    Smokeless tobacco: Never Used   Substance and Sexual Activity    Alcohol use: Yes     Alcohol/week: 10.0 standard drinks     Types: 10 Glasses of wine per week     Frequency: Monthly or less     Drinks per session: 3 or 4     Comment: occasional liquor but not weekly     Drug use: Never    Sexual activity: Yes     Partners: Male   Lifestyle    Physical activity     Days per week: Not on file     Minutes per session: Not on file    Stress: Not on file   Relationships    Social connections     Talks on phone: Not on file     Gets together: Not on file     Attends Sikhism service: Not on file     Active member of club or organization: Not on file     Attends meetings of clubs or organizations: Not on file     Relationship status: Not on file    Intimate partner violence     Fear of current or ex partner: Not on file     Emotionally abused: Not on file     Physically abused: Not on file     Forced sexual activity: Not on file   Other Topics Concern    Not on file   Social History Narrative    Not on file           REVIEW OF SYSTEMS     Constitutional: Negative for chills, fever and malaise/fatigue. HENT: Negative for congestion, hearing loss and sore throat. Respiratory: Negative for cough, sputum production, shortness of breath and wheezing. Cardiovascular: Negative for chest pain. Gastrointestinal: Negative for abdominal pain, constipation, diarrhea, nausea and vomiting. Genitourinary: Negative for dysuria, flank pain, hematuria and urgency. Neurological: Negative for dizziness, loss of consciousness, weakness and headaches. Psychiatric/Behavioral: Negative for depression.      PHYSICAL EXAM  /80 (BP 1 Location: Right arm)   Resp 16   Ht 5' 6\" (1.676 m)   Wt 174 lb (78.9 kg)   BMI 28.08 kg/m²      Patient is a well-developed well-nourished female no apparent distress  She is alert and oriented x3  Head is normocephalic atraumatic pupils equal round react light accommodation  Neck is supple without adenopathy or thyromegaly  Heart is with regular rate and rhythm without murmurs rubs or gallops  Lungs are clear to auscultation and percussion bilaterally  Breasts are without masses bilaterally  Abdomen is soft nontender nondistended bowel sounds are present and active  Extremities are without clubbing cyanosis or edema  Pulses are full and symmetric bilaterally  Pelvic  External genitalia within normal limits  Urethra is midline there are no apparent urethral lesions the bladder is within normal limits  Vagina is with normal rugae there is minimal discharge present in the vaginal vault  Cervix is surgically absent  Uterus is surgically absent  Adnexa are without masses    ASSESSMENT and PLAN  Normal annual exam, genuine stress incontinence  Plan: Mammogram ordered, refer to Dr. Nelida Grove for evaluation  No results found for this visit on 03/19/21. No orders of the defined types were placed in this encounter.

## 2021-03-19 NOTE — PROGRESS NOTES
PT DAILY TREATMENT NOTE - [facility] 2-15    Patient Name: Rachel Duenas  Date:3/19/2021  : 1975  [x]  Patient  Verified  Payor: [address] [place] [address] Road / Plan: [address] / Product Type: Managed Care Medicaid /    In time: 910 am  Out time: 945 am  Total Treatment Time (min): 35  Total Timed Codes (min): 35  1:1 Treatment Time ( only): 35   Visit #:  3    Treatment Area: Pain in thoracic spine [M54.6]  Other chronic pain [G89.29]    SUBJECTIVE  Pain Level (0-10 scale): 7 (neck)   10 (mid-back)  Any medication changes, allergies to medications, adverse drug reactions, diagnosis change, or new procedure performed?: [x] No    [] Yes (see summary sheet for update)  Subjective functional status/changes:   [] No changes reported  No real difference in her back. Neck and shoulder feels a little better after taking steroid dose pack. OBJECTIVE    Modality rationale: decrease inflammation, decrease pain and increase tissue extensibility to improve the patients ability to complete ADLS and function with less pain. Min Type Additional Details       [] Estim: []Att   []Unatt    []TENS instruct                  []IFC  []Premod   []NMES                     []Other:  []w/US   []w/ice   []w/heat  Position:  Location:       []  Traction: [] Cervical       []Lumbar                       [] Prone          []Supine                       []Intermittent   []Continuous Lbs:  [] before manual  [] after manual  []w/heat    []  Ultrasound: []Continuous   [] Pulsed                       at: []1MHz   []3MHz Location:  W/cm2:         []  Ice     []  Heat  []  Ice massage Position:  Location:                 [x] Skin assessment post-treatment:  [x]intact []redness- no adverse reaction    []redness  adverse reaction:     25 min Therapeutic Exercise:  [x] See flow sheet :   Rationale: increase ROM and increase strength to improve the patients ability to complete ADLS and function with less pain.   Includes time spent discussing care and observing exercise response during rest breaks. 10 min Manual Therapy: seated traps/cervical paraspinals    Rationale: decrease pain, increase ROM, increase tissue extensibility and decrease trigger points to improve the patients ability to complete ADLS and function with less pain. With   [x] TE   [] TA   [] Neuro   [] SC   [x] other: Patient Education: [x] Review HEP    [] Progressed/Changed HEP based on:   [] positioning   [] body mechanics   [] transfers   [] heat/ice application    [x] other: diagnosis     Other Objective/Functional Measures: tightness to palpation B upper traps and cervical muscles     Pain Level (0-10 scale) post treatment: 5    ASSESSMENT/Changes in Function:   Patient did well with exercises in clinic. Understands posture and how exercise will affect her pain level. Less pain after exercise and manual therapy. No significant change in pain level after steroid dose pack. Patient will continue to benefit from skilled PT services to modify and progress therapeutic interventions, address ROM deficits, address strength deficits and analyze and modify body mechanics/ergonomics to attain remaining goals. [x]  See Plan of Care  []  See progress note/recertification  []  See Discharge Summary         Progress towards goals / Updated goals:  Short Term Goals: To be accomplished in 4 weeks:              1.  Independent with HEP. MET              2.  Able to sit for 1 hour without increased pain. Long Term Goals: To be accomplished in 12 weeks:              1.  Able to sleep 6 hours without waking due to pain. 2.  Sit to stand without UE assist.              3.  Able to walk up stairs without pain.     PLAN  []  Upgrade activities as tolerated     [x]  Continue plan of care  []  Update interventions per flow sheet       []  Discharge due to:_  []  Other:_      Angelina Aquino, PT 3/19/2021

## 2021-03-25 ENCOUNTER — OFFICE VISIT (OUTPATIENT)
Dept: OBGYN CLINIC | Age: 46
End: 2021-03-25
Payer: COMMERCIAL

## 2021-03-25 DIAGNOSIS — Z12.31 VISIT FOR SCREENING MAMMOGRAM: Primary | ICD-10-CM

## 2021-03-25 PROCEDURE — 77067 SCR MAMMO BI INCL CAD: CPT | Performed by: OBSTETRICS & GYNECOLOGY

## 2021-03-26 ENCOUNTER — HOSPITAL ENCOUNTER (OUTPATIENT)
Dept: PHYSICAL THERAPY | Age: 46
Discharge: HOME | End: 2021-03-26
Payer: COMMERCIAL

## 2021-03-26 PROCEDURE — 97140 MANUAL THERAPY 1/> REGIONS: CPT

## 2021-03-26 PROCEDURE — 97110 THERAPEUTIC EXERCISES: CPT

## 2021-03-26 NOTE — PROGRESS NOTES
PT PROGRESS NOTE - [facility] 2-15    Patient Name: Tegan Mao  Date:3/26/2021  : 1975  [x]  Patient  Verified  [facility] [address] / Plan: [address] / Product Type: Managed Care Medicaid /    Visit #:  4    Treatment Area: Pain in thoracic spine [M54.6]  Other chronic pain [G89.29]    SUBJECTIVE  No real difference in her back. Neck and shoulder feels a little better after taking steroid dose pack. OBJECTIVE  tightness to palpation B upper traps and cervical muscles     ASSESSMENT/Changes in Function:   Patient did well with exercises in clinic. Understands posture and how exercise will affect her pain level. Less pain after exercise and manual therapy. No significant change in pain level after steroid dose pack. Patient will continue to benefit from skilled PT services to modify and progress therapeutic interventions, address ROM deficits, address strength deficits and analyze and modify body mechanics/ergonomics to attain remaining goals. [x]  See Plan of Care  []  See progress note/recertification  []  See Discharge Summary         Progress towards goals / Updated goals:  Short Term Goals: To be accomplished in 4 weeks:              1.  Independent with HEP. MET              2.  Able to sit for 1 hour without increased pain. Long Term Goals: To be accomplished in 12 weeks:              1.  Able to sleep 6 hours without waking due to pain. 2.  Sit to stand without UE assist.              3.  Able to walk up stairs without pain.     PLAN  []  Upgrade activities as tolerated     [x]  Continue plan of care  []  Update interventions per flow sheet       []  Discharge due to:_  []  Other:_      Benjie Garza, PT 3/26/2021

## 2021-03-26 NOTE — PROGRESS NOTES
PT DAILY TREATMENT NOTE - [facility] 2-15    Patient Name: Yann Byrne  Date:3/26/2021  : 1975  [x]  Patient  Verified  [facility] [address] / Plan: [address] / Product Type: Managed Care Medicaid /    In time: 910 am  Out time: 950 am  Total Treatment Time (min): 40  Total Timed Codes (min): 40  1:1 Treatment Time ( only): 40  Visit #:  4    Treatment Area: Pain in thoracic spine [M54.6]  Other chronic pain [G89.29]    SUBJECTIVE  Pain Level (0-10 scale): 8 (neck)   8 (mid-back)  Any medication changes, allergies to medications, adverse drug reactions, diagnosis change, or new procedure performed?: [x] No    [] Yes (see summary sheet for update)  Subjective functional status/changes:   [] No changes reported  Reports and increase in the L upper back today. No change in her routine or activity level at home. OBJECTIVE    Modality rationale: decrease inflammation, decrease pain and increase tissue extensibility to improve the patients ability to complete ADLS and function with less pain. Min Type Additional Details       [] Estim: []Att   []Unatt    []TENS instruct                  []IFC  []Premod   []NMES                     []Other:  []w/US   []w/ice   []w/heat  Position:  Location:       []  Traction: [] Cervical       []Lumbar                       [] Prone          []Supine                       []Intermittent   []Continuous Lbs:  [] before manual  [] after manual  []w/heat    []  Ultrasound: []Continuous   [] Pulsed                       at: []1MHz   []3MHz Location:  W/cm2:         []  Ice     []  Heat  []  Ice massage Position:  Location:                 [x] Skin assessment post-treatment:  [x]intact []redness- no adverse reaction    []redness  adverse reaction:     20 min Therapeutic Exercise:  [x] See flow sheet :   Rationale: increase ROM and increase strength to improve the patients ability to complete ADLS and function with less pain.   Includes time spent discussing care and observing exercise response during rest breaks. 20 min Manual Therapy: seated traps/cervical paraspinals    Rationale: decrease pain, increase ROM, increase tissue extensibility and decrease trigger points to improve the patients ability to complete ADLS and function with less pain. With   [x] TE   [] TA   [] Neuro   [] SC   [x] other: Patient Education: [x] Review HEP    [] Progressed/Changed HEP based on:   [] positioning   [] body mechanics   [] transfers   [] heat/ice application    [x] other: diagnosis     Other Objective/Functional Measures: tightness to palpation B upper traps and cervical muscles L>R    Pain Level (0-10 scale) post treatment: 5     ASSESSMENT/Changes in Function:   Patient did well with exercises in clinic. Significant tightness in the L upper traps that improved with manual therapy and exercise. Patient will continue to benefit from skilled PT services to modify and progress therapeutic interventions, address ROM deficits, address strength deficits and analyze and modify body mechanics/ergonomics to attain remaining goals. [x]  See Plan of Care  []  See progress note/recertification  []  See Discharge Summary         Progress towards goals / Updated goals:  Short Term Goals: To be accomplished in 4 weeks:              1.  Independent with HEP. MET              2.  Able to sit for 1 hour without increased pain. Long Term Goals: To be accomplished in 12 weeks:              1.  Able to sleep 6 hours without waking due to pain. 2.  Sit to stand without UE assist.              3.  Able to walk up stairs without pain.     PLAN  []  Upgrade activities as tolerated     [x]  Continue plan of care  []  Update interventions per flow sheet       []  Discharge due to:_  []  Other:_      Thais Anguiano, PT 3/26/2021

## 2021-03-31 ENCOUNTER — OFFICE VISIT (OUTPATIENT)
Dept: FAMILY MEDICINE CLINIC | Age: 46
End: 2021-03-31
Payer: COMMERCIAL

## 2021-03-31 VITALS
WEIGHT: 176 LBS | OXYGEN SATURATION: 99 % | TEMPERATURE: 97.5 F | SYSTOLIC BLOOD PRESSURE: 125 MMHG | HEIGHT: 66 IN | BODY MASS INDEX: 28.28 KG/M2 | DIASTOLIC BLOOD PRESSURE: 78 MMHG | HEART RATE: 77 BPM

## 2021-03-31 DIAGNOSIS — M54.6 CHRONIC BILATERAL THORACIC BACK PAIN: Primary | ICD-10-CM

## 2021-03-31 DIAGNOSIS — M54.2 NECK PAIN: ICD-10-CM

## 2021-03-31 DIAGNOSIS — F41.9 ANXIETY: ICD-10-CM

## 2021-03-31 DIAGNOSIS — M25.551 BILATERAL HIP PAIN: ICD-10-CM

## 2021-03-31 DIAGNOSIS — M25.552 BILATERAL HIP PAIN: ICD-10-CM

## 2021-03-31 DIAGNOSIS — E05.90 HYPERTHYROIDISM: ICD-10-CM

## 2021-03-31 DIAGNOSIS — E11.65 TYPE 2 DIABETES MELLITUS WITH HYPERGLYCEMIA, WITHOUT LONG-TERM CURRENT USE OF INSULIN (HCC): ICD-10-CM

## 2021-03-31 DIAGNOSIS — F32.9 MAJOR DEPRESSIVE DISORDER WITH SINGLE EPISODE, REMISSION STATUS UNSPECIFIED: ICD-10-CM

## 2021-03-31 DIAGNOSIS — G89.29 CHRONIC BILATERAL THORACIC BACK PAIN: Primary | ICD-10-CM

## 2021-03-31 DIAGNOSIS — M51.9 THORACIC DISC DISEASE: ICD-10-CM

## 2021-03-31 DIAGNOSIS — L30.9 ECZEMA, UNSPECIFIED TYPE: ICD-10-CM

## 2021-03-31 PROCEDURE — 99214 OFFICE O/P EST MOD 30 MIN: CPT | Performed by: FAMILY MEDICINE

## 2021-03-31 RX ORDER — CLOBETASOL PROPIONATE 0.5 MG/G
OINTMENT TOPICAL 2 TIMES DAILY
Qty: 15 G | Refills: 3 | Status: SHIPPED | OUTPATIENT
Start: 2021-03-31 | End: 2022-01-24 | Stop reason: SDUPTHER

## 2021-03-31 NOTE — PROGRESS NOTES
IDENTIFYING INFORMATION:  Rossy Ordonez , 39 y.o., female  [address],  [place] [address]     CHIEF COMPLAINT:   Chief Complaint   Patient presents with    Back Pain     HISTORY OF PRESENT ILLNESS:  Itz Posadas is a 39 y.o. female  has a past medical history of Dysphagia (11/4/2020), traumatic fracture, Hyperthyroidism, and Insomnia (11/4/2020). .  she comes in for Back pain. Has seen ortho and they tried a medrol dose pack and some gabapentin, she did not take that yet as she is supposed to finish the medrol first.  Also did an xray on her neck, some degeneration. Had some bilateral hip pain the other day and she had a pop and then it hurt for a few moments then got better. Hard to get around. She has some improvement from the left arm and finger numbness, not much. PAST MEDICAL HISTORY:   Past Medical History:   Diagnosis Date    Dysphagia 11/4/2020    Hx traumatic fracture     Hyperthyroidism     Insomnia 11/4/2020       MEDICATIONS:   Current Outpatient Medications on File Prior to Visit   Medication Sig Dispense Refill    methIMAzole (TAPAZOLE) 5 mg tablet Take 1 Tab by mouth daily. 90 Tab 1    cholecalciferol, vitamin D3, (VITAMIN D3 PO) Take 2,000 Units by mouth daily.  methocarbamoL (ROBAXIN) 500 mg tablet Take 1 Tab by mouth four (4) times daily. 120 Tab 0    ARIPiprazole (ABILIFY) 5 mg tablet       diclofenac EC (VOLTAREN) 75 mg EC tablet       clobetasoL (TEMOVATE) 0.05 % ointment Apply  to affected area two (2) times a day. 15 g 3    sertraline (ZOLOFT) 100 mg tablet Take 1 Tab by mouth daily. NEW Dose 30 Tab 3    [DISCONTINUED] nabumetone (RELAFEN) 500 mg tablet Take 1 Tab by mouth two (2) times a day. 60 Tab 2     No current facility-administered medications on file prior to visit.         ALLERGIES:  Allergies   Allergen Reactions    Pcn [Penicillins] Hives and Rash         SOCIAL HISTORY:   Social History     Tobacco Use    Smoking status: Never Smoker  Smokeless tobacco: Never Used   Substance Use Topics    Alcohol use: Yes     Alcohol/week: 10.0 standard drinks     Types: 10 Glasses of wine per week     Frequency: Monthly or less     Drinks per session: 3 or 4     Comment: occasional liquor but not weekly     Drug use: Never       SURGICAL HISTORY:  Past Surgical History:   Procedure Laterality Date    HX HYSTERECTOMY  2017    HX OTHER SURGICAL      bladder had to be fixed it was cut during hysterectomy        FAMILY HISTORY:  Family History   Problem Relation Age of Onset    Thyroid Disease Maternal Aunt     Thyroid Disease Maternal Aunt     Hypertension Mother     Hypertension Father          REVIEW OF SYSTEMS:  I personally collected this information from all available source present (patient/others in room and records available) -[email or web address]    Review of Systems   Constitutional: Negative for activity change, appetite change, chills, diaphoresis, fever and unexpected weight change. HENT: Negative for congestion, ear discharge, ear pain, hearing loss, rhinorrhea, sinus pressure, sneezing, sore throat, tinnitus, trouble swallowing and voice change. Eyes: Negative for photophobia, pain, discharge and visual disturbance. Respiratory: Negative for cough, chest tightness, shortness of breath and wheezing. Cardiovascular: Negative for chest pain, palpitations and leg swelling. Gastrointestinal: Negative for abdominal distention, abdominal pain, blood in stool, constipation, diarrhea, nausea and vomiting. Endocrine: Negative for cold intolerance, heat intolerance, polydipsia and polyphagia. Genitourinary: Negative for difficulty urinating, dysuria, frequency, hematuria and urgency. Musculoskeletal: Positive for arthralgias, back pain, joint swelling, myalgias and neck pain. Negative for gait problem. Skin: Positive for color change and rash.    Neurological: Negative for dizziness, tremors, syncope, speech difficulty, weakness, light-headedness, numbness and headaches. Hematological: Negative for adenopathy. Does not bruise/bleed easily. Psychiatric/Behavioral: Positive for dysphoric mood. Negative for agitation, behavioral problems, confusion, decreased concentration, hallucinations, sleep disturbance and suicidal ideas. The patient is nervous/anxious. PHYSICAL EXAMINATION:  Vital Signs:    Visit Vitals  /78 (BP 1 Location: Left upper arm, BP Patient Position: Sitting)   Pulse 77   Temp 97.5 °F (36.4 °C) (Temporal)   Ht 5' 6\" (1.676 m)   Wt 176 lb (79.8 kg)   SpO2 99%   BMI 28.41 kg/m²         Wt Readings from Last 3 Encounters:   03/31/21 176 lb (79.8 kg)   03/19/21 174 lb (78.9 kg)   03/03/21 175 lb (79.4 kg)     BP Readings from Last 3 Encounters:   03/31/21 125/78   03/19/21 122/80   03/03/21 116/74         Physical Exam  Nursing note reviewed. Constitutional:       General: She is not in acute distress. Appearance: Normal appearance. She is not ill-appearing or toxic-appearing. Eyes:      General: No scleral icterus. Extraocular Movements: Extraocular movements intact. Conjunctiva/sclera: Conjunctivae normal.      Pupils: Pupils are equal, round, and reactive to light. Neck:      Musculoskeletal: Neck rigidity and muscular tenderness present. Vascular: No carotid bruit. Cardiovascular:      Rate and Rhythm: Normal rate and regular rhythm. Heart sounds: No murmur. No friction rub. No gallop. Pulmonary:      Effort: Pulmonary effort is normal.      Breath sounds: Normal breath sounds. No wheezing, rhonchi or rales. Abdominal:      General: Bowel sounds are normal. There is no distension. Palpations: Abdomen is soft. There is no mass. Tenderness: There is no abdominal tenderness. Musculoskeletal:         General: Swelling, tenderness and deformity present. Right lower leg: No edema. Left lower leg: No edema.    Lymphadenopathy:      Cervical: No cervical adenopathy. Skin:     Capillary Refill: Capillary refill takes less than 2 seconds. Coloration: Skin is not jaundiced. Findings: No erythema or rash. Neurological:      General: No focal deficit present. Mental Status: She is alert and oriented to person, place, and time. Mental status is at baseline. Cranial Nerves: No cranial nerve deficit. Sensory: No sensory deficit. Coordination: Coordination normal.      Gait: Gait normal.   Psychiatric:         Mood and Affect: Mood normal.         Behavior: Behavior normal.         Thought Content: Thought content normal.         Judgment: Judgment normal.         ASSESSMENT/PLAN:    Discussion (regarding today's visit with Malina Savage); In addition of her hip pain which does seem to be possible bursitis and/or arthritis I think we will go ahead and work-up for some autoimmune issues. Also, it is time for her lipid panel. Depression seems stable so she will continue with her counselor and medications. She will follow up with the orthopedist and possibly, depending on what we find may need even another specialist.  Hopefully, we can get this resolved and improve her quality of life because right now she does not feel very good nor is  She Able to do much of anything. ICD-10-CM ICD-9-CM    1. Chronic bilateral thoracic back pain  M54.6 724.1 AROLDO BY MULTIPLEX FLOW IA, QL    G89.29 338.29 RHEUMATOID FACTOR, QL      SED RATE (ESR)   2. Major depressive disorder with single episode, remission status unspecified  F32.9 296.20    3. Neck pain  M54.2 723.1 RHEUMATOID FACTOR, QL      SED RATE (ESR)      METABOLIC PANEL, COMPREHENSIVE   4. Thoracic disc disease  M51.9 722.92    5. Anxiety  F41.9 300.00    6. Bilateral hip pain  M25.551 719.45 AROLDO BY MULTIPLEX FLOW IA, QL    M25.552  RHEUMATOID FACTOR, QL      SED RATE (ESR)      METABOLIC PANEL, COMPREHENSIVE   7. Hyperthyroidism  E05.90 242.90 CBC WITH AUTOMATED DIFF   8.  Type 2 diabetes mellitus with hyperglycemia, without long-term current use of insulin ([facility])  E11.65 250.00 LIPID PANEL     790.29    9. Eczema, unspecified type  L30.9 692.9 clobetasoL (TEMOVATE) 0.05 % ointment     WE reviewed each diagnosis listed for today's visit including medications, treatment, testing such as labs, imagine, referrals and when to call regarding results and appointments. Reminded patient to keep any and all appointments with specialists, labs, imaging. Reminded patient to make sure we get copies of any specialists care, labs and imaging. Reminded patient to call of come by the office if there are any concerns, questions , comments or problems. The patient verbalized understanding of the care plan and all questions were answered to the patient's satisfaction prior to leaving the office. The patient was told that failure to comply with recommended testing could result in abnormal health consequences. The patient was instructed to have yearly routine health maintenance including but not limited to age appropriate vaccines, testing, screening exams. ALL questions were answered to her satisfaction before leaving the office. The patient actively participated in medical decision making. FOLLOW UP:   Patient knows to keep any and all future visits scheduled unless told otherwise. Patient knows to call, come back if any concerns, questions, comments or problems arise. Follow-up and Dispositions    · Return in about 4 weeks (around 4/28/2021) for follow up pain, anxiety, rash. This visit may have been completed , in part, with voice recognition software as well as typing and may have syntax errors despite editing.       Prince Reed,

## 2021-03-31 NOTE — PROGRESS NOTES
1. Have you been to the ER, urgent care clinic since your last visit? Hospitalized since your last visit? No    2. Have you seen or consulted any other health care providers outside of the [address] [place] [address] since your last visit? Include any pap smears or colon screening.  No    Chief Complaint   Patient presents with    Back Pain       Visit Vitals  /78 (BP 1 Location: Left upper arm, BP Patient Position: Sitting)   Pulse 77   Temp 97.5 °F (36.4 °C) (Temporal)   Ht 5' 6\" (1.676 m)   Wt 176 lb (79.8 kg)   SpO2 99%   BMI 28.41 kg/m²

## 2021-04-02 ENCOUNTER — APPOINTMENT (OUTPATIENT)
Dept: PHYSICAL THERAPY | Age: 46
End: 2021-04-02

## 2021-04-09 ENCOUNTER — APPOINTMENT (OUTPATIENT)
Dept: PHYSICAL THERAPY | Age: 46
End: 2021-04-09

## 2021-04-16 ENCOUNTER — HOSPITAL ENCOUNTER (OUTPATIENT)
Dept: PHYSICAL THERAPY | Age: 46
Discharge: HOME | End: 2021-04-16

## 2021-04-16 LAB
ALBUMIN SERPL-MCNC: 4.4 G/DL (ref 3.8–4.8)
ALBUMIN/GLOB SERPL: 1.3 {RATIO} (ref 1.2–2.2)
ALP SERPL-CCNC: 79 IU/L (ref 39–117)
ALT SERPL-CCNC: 12 IU/L (ref 0–32)
ANA SER QL: NEGATIVE
AST SERPL-CCNC: 13 IU/L (ref 0–40)
BASOPHILS # BLD AUTO: 0 X10E3/UL (ref 0–0.2)
BASOPHILS NFR BLD AUTO: 1 %
BILIRUB SERPL-MCNC: 0.4 MG/DL (ref 0–1.2)
BUN SERPL-MCNC: 13 MG/DL (ref 6–24)
BUN/CREAT SERPL: 20 (ref 9–23)
CALCIUM SERPL-MCNC: 9.5 MG/DL (ref 8.7–10.2)
CHLORIDE SERPL-SCNC: 102 MMOL/L (ref 96–106)
CHOLEST SERPL-MCNC: 144 MG/DL (ref 100–199)
CO2 SERPL-SCNC: 24 MMOL/L (ref 20–29)
CREAT SERPL-MCNC: 0.65 MG/DL (ref 0.57–1)
EOSINOPHIL # BLD AUTO: 0.1 X10E3/UL (ref 0–0.4)
EOSINOPHIL NFR BLD AUTO: 1 %
ERYTHROCYTE [DISTWIDTH] IN BLOOD BY AUTOMATED COUNT: 13.2 % (ref 11.7–15.4)
ERYTHROCYTE [SEDIMENTATION RATE] IN BLOOD BY WESTERGREN METHOD: 13 MM/HR (ref 0–32)
GLOBULIN SER CALC-MCNC: 3.3 G/DL (ref 1.5–4.5)
GLUCOSE SERPL-MCNC: 81 MG/DL (ref 65–99)
HCT VFR BLD AUTO: 38.8 % (ref 34–46.6)
HDLC SERPL-MCNC: 59 MG/DL
HGB BLD-MCNC: 13 G/DL (ref 11.1–15.9)
IMM GRANULOCYTES # BLD AUTO: 0 X10E3/UL (ref 0–0.1)
IMM GRANULOCYTES NFR BLD AUTO: 0 %
LDLC SERPL CALC-MCNC: 74 MG/DL (ref 0–99)
LYMPHOCYTES # BLD AUTO: 2 X10E3/UL (ref 0.7–3.1)
LYMPHOCYTES NFR BLD AUTO: 26 %
MCH RBC QN AUTO: 27.4 PG (ref 26.6–33)
MCHC RBC AUTO-ENTMCNC: 33.5 G/DL (ref 31.5–35.7)
MCV RBC AUTO: 82 FL (ref 79–97)
MONOCYTES # BLD AUTO: 0.6 X10E3/UL (ref 0.1–0.9)
MONOCYTES NFR BLD AUTO: 8 %
NEUTROPHILS # BLD AUTO: 4.7 X10E3/UL (ref 1.4–7)
NEUTROPHILS NFR BLD AUTO: 64 %
PLATELET # BLD AUTO: 302 X10E3/UL (ref 150–450)
POTASSIUM SERPL-SCNC: 4.3 MMOL/L (ref 3.5–5.2)
PROT SERPL-MCNC: 7.7 G/DL (ref 6–8.5)
RBC # BLD AUTO: 4.74 X10E6/UL (ref 3.77–5.28)
RHEUMATOID FACT SERPL-ACNC: <10 IU/ML (ref 0–13.9)
SODIUM SERPL-SCNC: 138 MMOL/L (ref 134–144)
TRIGL SERPL-MCNC: 51 MG/DL (ref 0–149)
VLDLC SERPL CALC-MCNC: 11 MG/DL (ref 5–40)
WBC # BLD AUTO: 7.5 X10E3/UL (ref 3.4–10.8)

## 2021-04-16 NOTE — PROGRESS NOTES
Count is normal without anemia or infection. Liver and kidney function are normal.  Sugar is normal.  Cholesterol is normal.  The rheumatoid factor sed rate and AROLDO are all negative indicating no rheumatoid arthritis or autoimmune disease.

## 2021-04-23 ENCOUNTER — APPOINTMENT (OUTPATIENT)
Dept: PHYSICAL THERAPY | Age: 46
End: 2021-04-23

## 2021-04-30 ENCOUNTER — APPOINTMENT (OUTPATIENT)
Dept: PHYSICAL THERAPY | Age: 46
End: 2021-04-30

## 2021-05-06 ENCOUNTER — OFFICE VISIT (OUTPATIENT)
Dept: FAMILY MEDICINE CLINIC | Age: 46
End: 2021-05-06
Payer: COMMERCIAL

## 2021-05-06 VITALS
OXYGEN SATURATION: 99 % | TEMPERATURE: 97.3 F | HEART RATE: 77 BPM | DIASTOLIC BLOOD PRESSURE: 81 MMHG | SYSTOLIC BLOOD PRESSURE: 130 MMHG | WEIGHT: 174 LBS | BODY MASS INDEX: 27.97 KG/M2 | HEIGHT: 66 IN

## 2021-05-06 DIAGNOSIS — E05.90 HYPERTHYROIDISM: ICD-10-CM

## 2021-05-06 DIAGNOSIS — F41.9 ANXIETY: ICD-10-CM

## 2021-05-06 DIAGNOSIS — M51.9 THORACIC DISC DISEASE: ICD-10-CM

## 2021-05-06 DIAGNOSIS — M25.552 BILATERAL HIP PAIN: ICD-10-CM

## 2021-05-06 DIAGNOSIS — S42.92XA TRAUMATIC CLOSED DISPLACED FRACTURE OF LEFT SHOULDER WITH ANTERIOR DISLOCATION, INITIAL ENCOUNTER: ICD-10-CM

## 2021-05-06 DIAGNOSIS — G89.29 CHRONIC BILATERAL THORACIC BACK PAIN: Primary | ICD-10-CM

## 2021-05-06 DIAGNOSIS — M54.2 NECK PAIN: ICD-10-CM

## 2021-05-06 DIAGNOSIS — M25.551 BILATERAL HIP PAIN: ICD-10-CM

## 2021-05-06 DIAGNOSIS — M54.6 CHRONIC BILATERAL THORACIC BACK PAIN: Primary | ICD-10-CM

## 2021-05-06 DIAGNOSIS — F32.9 MAJOR DEPRESSIVE DISORDER WITH SINGLE EPISODE, REMISSION STATUS UNSPECIFIED: ICD-10-CM

## 2021-05-06 DIAGNOSIS — E11.65 TYPE 2 DIABETES MELLITUS WITH HYPERGLYCEMIA, WITHOUT LONG-TERM CURRENT USE OF INSULIN (HCC): ICD-10-CM

## 2021-05-06 DIAGNOSIS — M25.512 ACUTE PAIN OF LEFT SHOULDER: ICD-10-CM

## 2021-05-06 PROCEDURE — 99214 OFFICE O/P EST MOD 30 MIN: CPT | Performed by: FAMILY MEDICINE

## 2021-05-06 RX ORDER — SERTRALINE HYDROCHLORIDE 100 MG/1
TABLET, FILM COATED ORAL
Qty: 30 TAB | Refills: 3 | Status: SHIPPED | OUTPATIENT
Start: 2021-05-06

## 2021-05-06 NOTE — PROGRESS NOTES
1. Have you been to the ER, urgent care clinic since your last visit? Hospitalized since your last visit? Seen at [facility] left shoulder dislocation    2. Have you seen or consulted any other health care providers outside of the [address] [place] [address] since your last visit? Include any pap smears or colon screening.  Dr. Kandi Rodriguez, Dr. Phan Waters and Dr. Jase Guerrero at [address] [place] [address] Complaint   Patient presents with   Ernest Pummel Anxiety    Rash     Visit Vitals  /81 (BP 1 Location: Right upper arm, BP Patient Position: Sitting)   Pulse 77   Temp 97.3 °F (36.3 °C) (Temporal)   Ht 5' 6\" (1.676 m)   Wt 174 lb (78.9 kg)   SpO2 99%   BMI 28.08 kg/m²

## 2021-05-06 NOTE — PROGRESS NOTES
IDENTIFYING INFORMATION:  Rossy Mckinnon , 39 y.o., female  [address] [facility],  [place] [address]         CHIEF COMPLAINT:     Chief Complaint   Patient presents with    Anxiety    Rash         HISTORY OF PRESENT ILLNESS:    Epi Velazquez is a 39 y.o. female  has a past medical history of Dysphagia (11/4/2020), traumatic fracture, Hyperthyroidism, and Insomnia (11/4/2020). .  she comes in for 1 month follow-up. She says that she was just tossing her phone with her left hand onto her bed and her shoulder popped. It was very painful and she can move movement. She went to the ER and was dislocated. The relocated it there and she is currently in sling and swath. She is seen orthopedics and had an MRI of her shoulder yesterday. Results are pending. She also has pain in her right shoulder. She continues have pain in her back And legs. She wonders what is going on. She did have a rheumatological and autoimmune work-up and that was negative. She feels like \"everything is going wrong \". She does feel like the sertraline could be a little stronger. PAST MEDICAL HISTORY:     Past Medical History:   Diagnosis Date    Dysphagia 11/4/2020    Hx traumatic fracture     Hyperthyroidism     Insomnia 11/4/2020       MEDICATIONS:     Current Outpatient Medications on File Prior to Visit   Medication Sig Dispense Refill    clobetasoL (TEMOVATE) 0.05 % ointment Apply  to affected area two (2) times a day. 15 g 3    methIMAzole (TAPAZOLE) 5 mg tablet Take 1 Tab by mouth daily. 90 Tab 1    cholecalciferol, vitamin D3, (VITAMIN D3 PO) Take 2,000 Units by mouth daily.  methocarbamoL (ROBAXIN) 500 mg tablet Take 1 Tab by mouth four (4) times daily. 120 Tab 0    ARIPiprazole (ABILIFY) 5 mg tablet       diclofenac EC (VOLTAREN) 75 mg EC tablet       [DISCONTINUED] sertraline (ZOLOFT) 100 mg tablet Take 1 Tab by mouth daily.  NEW Dose 30 Tab 3     No current facility-administered medications on file prior to visit. ALLERGIES:    Allergies   Allergen Reactions    Pcn [Penicillins] Hives and Rash         SOCIAL HISTORY:     Social History     Tobacco Use    Smoking status: Never Smoker    Smokeless tobacco: Never Used   Substance Use Topics    Alcohol use: Yes     Alcohol/week: 10.0 standard drinks     Types: 10 Glasses of wine per week     Frequency: Monthly or less     Drinks per session: 3 or 4     Comment: occasional liquor but not weekly     Drug use: Never       SURGICAL HISTORY:    Past Surgical History:   Procedure Laterality Date    HX HYSTERECTOMY  2017    HX OTHER SURGICAL      bladder had to be fixed it was cut during hysterectomy        FAMILY HISTORY:    Family History   Problem Relation Age of Onset    Thyroid Disease Maternal Aunt     Thyroid Disease Maternal Aunt     Hypertension Mother     Hypertension Father          REVIEW OF SYSTEMS:    I personally collected this information from all available source present (patient/others in room and records available) -[email or web address]    Review of Systems   Constitutional: Positive for malaise/fatigue. Negative for chills, diaphoresis, fever and weight loss. HENT: Negative for congestion, ear pain, hearing loss, nosebleeds, sinus pain, sore throat and tinnitus. Eyes: Negative for blurred vision, double vision, photophobia and pain. Respiratory: Negative for cough, sputum production and shortness of breath. Cardiovascular: Negative for chest pain, palpitations, orthopnea, claudication, leg swelling and PND. Gastrointestinal: Negative for abdominal pain, blood in stool, constipation, diarrhea, heartburn, melena, nausea and vomiting. Genitourinary: Negative for dysuria, frequency and urgency. Musculoskeletal: Positive for back pain, joint pain, myalgias and neck pain. Negative for falls. Skin: Negative for itching and rash. Neurological: Negative for dizziness, tingling, tremors, sensory change, focal weakness and headaches. Psychiatric/Behavioral: Positive for depression. Negative for suicidal ideas. The patient is nervous/anxious and has insomnia. PHYSICAL EXAMINATION:    Vital Signs:    Visit Vitals  /81 (BP 1 Location: Right upper arm, BP Patient Position: Sitting)   Pulse 77   Temp 97.3 °F (36.3 °C) (Temporal)   Ht 5' 6\" (1.676 m)   Wt 174 lb (78.9 kg)   SpO2 99%   BMI 28.08 kg/m²         Wt Readings from Last 3 Encounters:   05/06/21 174 lb (78.9 kg)   03/31/21 176 lb (79.8 kg)   03/19/21 174 lb (78.9 kg)     BP Readings from Last 3 Encounters:   05/06/21 130/81   03/31/21 125/78   03/19/21 122/80         Physical Exam  Vitals signs reviewed. Constitutional:       General: She is not in acute distress. Appearance: She is not ill-appearing. Eyes:      General: No scleral icterus. Extraocular Movements: Extraocular movements intact. Conjunctiva/sclera: Conjunctivae normal.      Pupils: Pupils are equal, round, and reactive to light. Neck:      Musculoskeletal: No neck rigidity or muscular tenderness. Vascular: No carotid bruit. Cardiovascular:      Rate and Rhythm: Normal rate and regular rhythm. Pulses: Normal pulses. Heart sounds: No murmur. No friction rub. No gallop. Pulmonary:      Effort: Pulmonary effort is normal.      Breath sounds: Normal breath sounds. No wheezing, rhonchi or rales. Abdominal:      General: There is no distension. Palpations: Abdomen is soft. There is no mass. Tenderness: There is no abdominal tenderness. There is no guarding. Musculoskeletal:         General: Tenderness and deformity present. No swelling or signs of injury. Right lower leg: No edema. Left lower leg: No edema. Comments: Left arm in sling and swath. The right shoulder is tender biceps tendon AC joint and trapezius. She has bilateral thoracolumbar tenderness from the scapular region down to the lower lumbars.   She also has some mid to lower cervical tenderness bilaterally. Knees are tender medially each side. Lymphadenopathy:      Cervical: No cervical adenopathy. Skin:     General: Skin is warm and dry. Capillary Refill: Capillary refill takes less than 2 seconds. Coloration: Skin is not jaundiced. Findings: No bruising, erythema or rash. Neurological:      General: No focal deficit present. Mental Status: She is alert and oriented to person, place, and time. Cranial Nerves: No cranial nerve deficit. Sensory: No sensory deficit. Motor: No weakness. Coordination: Coordination normal.      Gait: Gait normal.      Deep Tendon Reflexes: Reflexes normal.   Psychiatric:         Behavior: Behavior normal.         Thought Content: Thought content normal.         Judgment: Judgment normal.      Comments: DEPRESSED Mood and flat affect. ASSESSMENT/PLAN:      Discussion (regarding today's visit with Farnaz Camargo);  We will increase the sertraline to 150 mg daily.  Follow-up 1 month for this.  Continue with orthopedic care.  Continue with muscle relaxants anti-inflammatories. ICD-10-CM ICD-9-CM    1. Chronic bilateral thoracic back pain  M54.6 724.1     G89.29 338.29    2. Major depressive disorder with single episode, remission status unspecified  F32.9 296.20    3. Neck pain  M54.2 723.1    4. Thoracic disc disease  M51.9 722.92    5. Bilateral hip pain  M25.551 719.45     M25.552     6. Hyperthyroidism  E05.90 242.90    7. Type 2 diabetes mellitus with hyperglycemia, without long-term current use of insulin (Formerly [facility])  E11.65 250.00      790.29    8. Acute pain of left shoulder  M25.512 719.41    9. Traumatic closed displaced fracture of left shoulder with anterior dislocation, initial encounter  S42. 92XA 812.00    10.  Anxiety  F41.9 300.00 sertraline (ZOLOFT) 100 mg tablet       WE reviewed each diagnosis listed for today's visit including medications, treatment, testing such as labs, imagine, referrals and when to call regarding results and appointments. Reminded patient to keep any and all appointments with specialists, labs, imaging. Reminded patient to make sure we get copies of any specialists care, labs and imaging. Reminded patient to call of come by the office if there are any concerns, questions , comments or problems. The patient verbalized understanding of the care plan and all questions were answered to the patient's satisfaction prior to leaving the office. The patient was told that failure to comply with recommended testing could result in abnormal health consequences. The patient was instructed to have yearly routine health maintenance including but not limited to age appropriate vaccines, testing, screening exams. ALL questions were answered to her satisfaction before leaving the office. The patient actively participated in medical decision making. FOLLOW UP:     Patient knows to keep any and all future visits scheduled unless told otherwise. Patient knows to call, come back if any concerns, questions, comments or problems arise. Follow-up and Dispositions    · Return in about 4 weeks (around 6/3/2021) for Follow up anxiety, arthritis back pain. This visit may have been completed , in part, with voice recognition software as well as typing and may have syntax errors despite editing.       Keli Julian, DO

## 2021-05-06 NOTE — PATIENT INSTRUCTIONS
General Health and concerns:  HEART HEALTHY DIET:  A heart healthy diet is one that is low in cholesterol (less than 300 mg daily), fat (less than 80 g daily) . You should also minimize carbohydrates / sugars (less amounts of breads, pastas, potato and potato products and sugary foods/snacks, cookies, cakes, etc) . Try to eat whole wheat/multigrain breads and pastas and eat more vegetables. Cook with olive oil (or no oil) and grill, bake, broil or boil foods. Less red meat and more chicken , fish and lean cuts of beef (limited). 1860-4736 calories per day is sufficient 3015-8848 is acceptable for weight loss. EXERCISE:  You should do exercise 3-5 days per week (minimum) to include increasing your heart rate for 30 to 45 minutes. At least a pace of a brisk walk should do that. This build up your heart and lung endurance and muscles and helps many function of the body. OTHER:        Routine Health maintenance: You need to get a yearly follow up/physical exam to review, discuss age and gender appropriate exams, labs, vaccines and screening tests. This includes cardiovascular health risk, cancer screens and other wild related topics. Medications-Take all medications as directed. Please do not stop unless you talk to your doctor or health care provider first. Report any problems immediately. Referrals: if you have been given a referral, please call the office if you do not hear from provider in one week. You may make the appointment yourself. Please keep all appointments with specialists and ask them to send their notes, thoughts, recommendations to us , as your PCP. Imaging/Labs:  Be sure to get these images in a timely manner. IF your test must be scheduled, let us know if you need help getting this done and if you do not hear from that provider in a week , call us or them.   BE SURE to call the office if you do not hear regarding the results in one week after the test is performed Image or lab). It is our intention to inform you of the results ALWAYS, even if normal you should get a notification (Call, portal message). PLEASE ambar if you do not get the results. PLEASE follow all recommendations and call/come in /ask questions if you do not understand of if problems develop after or in between visits. Failure to comply with recommended health care advise could result in serious health consequences. Thank you for choosing our practice and please let us know how we can help you feel better and stay well!

## 2021-05-12 ENCOUNTER — OFFICE VISIT (OUTPATIENT)
Dept: OBGYN CLINIC | Age: 46
End: 2021-05-12
Payer: COMMERCIAL

## 2021-05-12 VITALS
HEIGHT: 66 IN | SYSTOLIC BLOOD PRESSURE: 112 MMHG | BODY MASS INDEX: 28.28 KG/M2 | WEIGHT: 176 LBS | DIASTOLIC BLOOD PRESSURE: 68 MMHG

## 2021-05-12 DIAGNOSIS — N89.8 VAGINAL DISCHARGE: Primary | ICD-10-CM

## 2021-05-12 DIAGNOSIS — B37.31 MONILIAL VULVOVAGINITIS: ICD-10-CM

## 2021-05-12 PROCEDURE — 99212 OFFICE O/P EST SF 10 MIN: CPT | Performed by: OBSTETRICS & GYNECOLOGY

## 2021-05-12 RX ORDER — HYDROCODONE BITARTRATE AND ACETAMINOPHEN 7.5; 325 MG/1; MG/1
TABLET ORAL
COMMUNITY
Start: 2021-04-09 | End: 2021-07-23 | Stop reason: ALTCHOICE

## 2021-05-12 RX ORDER — FLUCONAZOLE 150 MG/1
150 TABLET ORAL
Qty: 2 TAB | Refills: 0 | Status: SHIPPED | OUTPATIENT
Start: 2021-05-12 | End: 2021-05-12

## 2021-05-12 NOTE — PROGRESS NOTES
Judith Thompson is a 39 y.o. female who presents today for the following:  Chief Complaint   Patient presents with    Vaginitis          HPI  Patient is a 80-year-old female who presents today with complaints of recurrent bacterial vaginosis. She states she has been using boric acid suppositories which she purchased through [address] to treat this condition. OB History    No obstetric history on file. /68 (BP 1 Location: Left upper arm, BP Patient Position: Sitting, BP Cuff Size: Large adult)   Ht 5' 6\" (1.676 m)   Wt 176 lb (79.8 kg)   BMI 28.41 kg/m²    OBGyn Exam   Patient is well-developed well-nourished female no apparent distress  She is alert and oriented x3  Pelvic  External genitalia within normal limits  Urethra is midline there are no apparent urethral lesions bladder is within normal limits  Vagina is with normal rugae, there is a thick white vaginal discharge present  Vaginal cuff is intact  Wet prep reveals budding yeast and hyphae  No results found for this visit on 05/12/21. Assessment:  Yeast vaginitis  Plan: Fluconazole Rx sent, NU swab sent, follow-up on culture result  Orders Placed This Encounter    NUSWAB VAGINITIS PLUS (VG+) WITH CANDIDA (SIX SPECIES)    HYDROcodone-acetaminophen (NORCO) 7.5-325 mg per tablet     Sig: take 1 tablet by mouth every 6 hours if needed for pain    BORIC ACID     Sig: by Does Not Apply route.

## 2021-05-12 NOTE — PROGRESS NOTES
Chief Complaint   Patient presents with    Vaginitis     Visit Vitals  /68 (BP 1 Location: Left upper arm, BP Patient Position: Sitting, BP Cuff Size: Large adult)   Ht 5' 6\" (1.676 m)   Wt 176 lb (79.8 kg)   BMI 28.41 kg/m²

## 2021-05-15 LAB
A VAGINAE DNA VAG QL NAA+PROBE: ABNORMAL SCORE
BVAB2 DNA VAG QL NAA+PROBE: ABNORMAL SCORE
C ALBICANS DNA VAG QL NAA+PROBE: NEGATIVE
C GLABRATA DNA VAG QL NAA+PROBE: NEGATIVE
C KRUSEI DNA VAG QL NAA+PROBE: NEGATIVE
C LUSITANIAE DNA VAG QL NAA+PROBE: NEGATIVE
C TRACH DNA VAG QL NAA+PROBE: NEGATIVE
CANDIDA DNA VAG QL NAA+PROBE: NEGATIVE
MEGA1 DNA VAG QL NAA+PROBE: ABNORMAL SCORE
N GONORRHOEA DNA VAG QL NAA+PROBE: NEGATIVE
T VAGINALIS DNA VAG QL NAA+PROBE: NEGATIVE

## 2021-05-18 RX ORDER — METRONIDAZOLE 500 MG/1
500 TABLET ORAL 2 TIMES DAILY
Qty: 14 TAB | Refills: 0 | Status: SHIPPED | OUTPATIENT
Start: 2021-05-18 | End: 2021-05-25

## 2021-05-19 ENCOUNTER — TELEPHONE (OUTPATIENT)
Dept: OBGYN CLINIC | Age: 46
End: 2021-05-19

## 2021-05-19 NOTE — TELEPHONE ENCOUNTER
----- Message from Tiffanie Cuenca MD sent at 5/18/2021 12:58 PM EDT -----  Please call the patient regarding her abnormal result. Please notify patient culture positive for bacterial vaginosis.   Rx for Flagyl 500 mg p.o. every 12 hours x7 days sent to patient's pharmacy

## 2021-05-19 NOTE — TELEPHONE ENCOUNTER
Spoke with the patient and advised her of her bacterial infection and a prescription has been submitted to her pharmacy.

## 2021-06-18 ENCOUNTER — OFFICE VISIT (OUTPATIENT)
Dept: ENDOCRINOLOGY | Age: 46
End: 2021-06-18
Payer: COMMERCIAL

## 2021-06-18 VITALS
OXYGEN SATURATION: 100 % | HEIGHT: 66 IN | WEIGHT: 175 LBS | BODY MASS INDEX: 28.12 KG/M2 | HEART RATE: 76 BPM | DIASTOLIC BLOOD PRESSURE: 89 MMHG | TEMPERATURE: 98 F | SYSTOLIC BLOOD PRESSURE: 135 MMHG

## 2021-06-18 DIAGNOSIS — E05.90 HYPERTHYROIDISM: Primary | ICD-10-CM

## 2021-06-18 DIAGNOSIS — E55.9 VITAMIN D DEFICIENCY: ICD-10-CM

## 2021-06-18 DIAGNOSIS — E05.00 GRAVES DISEASE: ICD-10-CM

## 2021-06-18 LAB
T4 FREE SERPL-MCNC: 1 NG/DL (ref 0.8–1.5)
TSH SERPL DL<=0.05 MIU/L-ACNC: 0.32 UIU/ML (ref 0.36–3.74)

## 2021-06-18 PROCEDURE — 99214 OFFICE O/P EST MOD 30 MIN: CPT | Performed by: INTERNAL MEDICINE

## 2021-06-18 NOTE — PROGRESS NOTES
Laly Rai MD          Patient Information  Date:6/18/2021  Name : Duglas Guidry 39 y.o.     YOB: 1975         Referred by: Mendoza Woodward, DO         Chief Complaint   Patient presents with    Thyroid Problem       History of present illness    Duglas Guidry is a 39 y.o. female  here for fu of thyroid. She was seen in 2017, was on methimazole. Lost insurance, off medications, restarted methimazole in March 2019 which she took it for less than 6 months, did not follow-up and self stopped  In October 2020 she presented to PCP with racing of the heart, nervousness, labs were suggestive of hyperthyroidism, she was restarted on 10 mg of methimazole  No fever  No recent labs    She had anemia in the past, status post hysterectomy in 2018      Prior hx       She was diagnosed with hyperthyroidism/Graves disease 10 years ago, intermittently on PTU, reports fluctuating weight. She attributed hair loss to PTU and hence stopped it. She had stopped antithyroid medications in the past thinking that is the cause for hair loss             FH of thyroid disease. Wt Readings from Last 3 Encounters:   06/18/21 175 lb (79.4 kg)   05/12/21 176 lb (79.8 kg)   05/06/21 174 lb (78.9 kg)       Past Medical History:   Diagnosis Date    Dysphagia 11/4/2020    Hx traumatic fracture     Hyperthyroidism     Insomnia 11/4/2020    Vaginitis, atrophic        Current Outpatient Medications   Medication Sig    HYDROcodone-acetaminophen (NORCO) 7.5-325 mg per tablet take 1 tablet by mouth every 6 hours if needed for pain    sertraline (ZOLOFT) 100 mg tablet One and one half tablets (150) mg orally every day (new dose)    clobetasoL (TEMOVATE) 0.05 % ointment Apply  to affected area two (2) times a day.  methIMAzole (TAPAZOLE) 5 mg tablet Take 1 Tab by mouth daily.  cholecalciferol, vitamin D3, (VITAMIN D3 PO) Take 2,000 Units by mouth daily.     ARIPiprazole (ABILIFY) 5 mg tablet     BORIC ACID by Does Not Apply route. (Patient not taking: Reported on 6/18/2021)    diclofenac EC (VOLTAREN) 75 mg EC tablet  (Patient not taking: Reported on 6/18/2021)     No current facility-administered medications for this visit. Review of Systems:  - Per HPI    Physical Examination:  - Blood pressure 135/89, pulse 76, temperature 98 °F (36.7 °C), height 5' 6\" (1.676 m), weight 175 lb (79.4 kg), SpO2 100 %. Body mass index is 28.25 kg/m². - General: pleasant, no distress, good eye contact  - HEENT: no exophthalmos, no periorbital edema, EOMI  - Neck: No thyromegaly  - CVS: S1-S2 regular  - RS: Normal respiratory effort  - Musculoskeletal: no tremors  - Neurological: alert and oriented  - Psychiatric: normal mood and affect  - Skin: Normal color    Data Reviewed:   Lab Results   Component Value Date/Time    TSH 0.41 02/19/2021 10:26 AM    T4, Free 0.9 02/19/2021 10:26 AM       Lab Results   Component Value Date/Time    WBC 7.5 04/15/2021 07:57 AM    HGB 13.0 04/15/2021 07:57 AM    HCT 38.8 04/15/2021 07:57 AM    PLATELET 094 76/63/7660 07:57 AM    MCV 82 04/15/2021 07:57 AM     [x] Reviewed labs    Assessment/Plan:     1. Hyperthyroidism    2. Graves disease      Grave's disease. She has intermittently been taking methimazole. Methimazole since October 2020  Continue 5 mg      Avoid skipping meals, avoid artificial sweeteners    Palpitations:     Vitamin D deficiency        Thank you for allowing me to participate in the care of this patient.     Susy Arredondo MD      Patient verbalized understanding

## 2021-06-20 LAB — T3 SERPL-MCNC: 96 NG/DL (ref 71–180)

## 2021-07-23 ENCOUNTER — VIRTUAL VISIT (OUTPATIENT)
Dept: FAMILY MEDICINE CLINIC | Age: 46
End: 2021-07-23
Payer: COMMERCIAL

## 2021-07-23 DIAGNOSIS — M25.512 ACUTE PAIN OF LEFT SHOULDER: ICD-10-CM

## 2021-07-23 DIAGNOSIS — G89.29 CHRONIC BILATERAL THORACIC BACK PAIN: Primary | ICD-10-CM

## 2021-07-23 DIAGNOSIS — M54.6 CHRONIC BILATERAL THORACIC BACK PAIN: Primary | ICD-10-CM

## 2021-07-23 PROCEDURE — 99212 OFFICE O/P EST SF 10 MIN: CPT | Performed by: FAMILY MEDICINE

## 2021-07-23 NOTE — PATIENT INSTRUCTIONS
General Health and concerns:  HEART HEALTHY DIET:  A heart healthy diet is one that is low in cholesterol (less than 300 mg daily), fat (less than 80 g daily) . You should also minimize carbohydrates / sugars (less amounts of breads, pastas, potato and potato products and sugary foods/snacks, cookies, cakes, etc) . Try to eat whole wheat/multigrain breads and pastas and eat more vegetables. Cook with olive oil (or no oil) and grill, bake, broil or boil foods. Less red meat and more chicken , fish and lean cuts of beef (limited). 0638-9313 calories per day is sufficient 4630-3583 is acceptable for weight loss. EXERCISE:  You should do exercise 3-5 days per week (minimum) to include increasing your heart rate for 30 to 45 minutes. At least a pace of a brisk walk should do that. This build up your heart and lung endurance and muscles and helps many function of the body. OTHER:        Routine Health maintenance: You need to get a yearly follow up/physical exam to review, discuss age and gender appropriate exams, labs, vaccines and screening tests. This includes cardiovascular health risk, cancer screens and other wild related topics. Medications-take all medications as directed. Please do not stop unless you talk to your doctor or health care provider first. Report any problems immediately. Referrals: if you have been given a referral, please call the office if you do not hear from provider in one week. You may make the appointment yourself. Please keep all appointments with specialists and ask them to send their notes, thoughts, recommendations to us , as your PCP. Imaging/Labs:  Be sure to get these images in a timely manner. IF your test must be scheduled, let us know if you need help getting this done and if you do not hear from that provider in a week , call us or them.   BE SURE to call the office if you do not hear regarding the results in one week after the test is performed Image or lab). It is our intention to inform you of the results ALWAYS, even if normal you should get a notification (Call, portal message). PLEASE ambar if you do not get the results. PLEASE follow all recommendations and call/come in /ask questions if you do not understand of if problems develop after or in between visits. Failure to comply with recommended health care advise could result in serious health consequences. You have had a virtual visit today using technology that allows real-time interaction between you and the physician. It does NOT replace personal, face to face , in person visits with a health care provider. Please note that certain diagnoses and advised comes from knowledge of medical conditions and depends HEAVILY upon information you provide the physician. IF there are any concerns or you do not improve you should be seen in person, face to face by a healthcare provider. Thank you for choosing our practice and please let us know how we can help you feel better and stay well!

## 2021-07-23 NOTE — PROGRESS NOTES
IDENTIFICATION:  Rossy Rodriguez 39 y.o. female     Medical Record Number: [id number]      This is a video visit performed with doxy. me due to COVID-19 Pandemic. It utilizes video and audio technology that allows real time interaction with the patient. It is documented in [address] [place] [address] [place]  realizes that this is a billable charge and agrees to proceed. The patient's identity is confirmed. Their location is confirmed to be in the state where provider is licensed. CC (Chief Complaint):    Chief Complaint   Patient presents with    Shoulder Pain         MEDICAL PROBLEM LIST (Past and Current):     Past Medical History:   Diagnosis Date    Dysphagia 11/4/2020    Hx traumatic fracture     Hyperthyroidism     Insomnia 11/4/2020    Vaginitis, atrophic          HPI(History of the Present Illness):    Margarita Fontaine is a 39 y.o. female   has a past medical history of Dysphagia (11/4/2020), traumatic fracture, Hyperthyroidism, Insomnia (11/4/2020), and Vaginitis, atrophic. .  she asks for a virtual visit today due to for her neck, shoulder and back pain. She has been seen by Ortho. She is actually about 8 weeks postop for labral tear on the left shoulder. She is seeing the surgeon next week and actually will be getting the right side addressed. She says it feels like the left side did and actually worse. She is doing well with her postop progression, getting PT and pain is decreasing a little. However, she still has neck pain shoulder pain right side and back pain. She says she had an EMG and they are going to try some shots in her neck and send her to a hand specialist.  I do not have the results but it sounds like she may have carpal tunnel syndrome. She does have the numbness and tingling in her hands. She still has some anxiety and depression. She is seeing a psychiatrist and gotten Abilify and is still taking the sertraline.   She denies any side effects from her medicine. HISTORICAL DATA (Social, surgical and family histories) :    Social History     Tobacco Use    Smoking status: Never Smoker    Smokeless tobacco: Never Used   Vaping Use    Vaping Use: Never used   Substance Use Topics    Alcohol use: Yes     Alcohol/week: 10.0 standard drinks     Types: 10 Glasses of wine per week     Comment: occasional liquor but not weekly     Drug use: Never     Past Surgical History:   Procedure Laterality Date    HX HYSTERECTOMY  2017    HX OTHER SURGICAL      bladder had to be fixed it was cut during hysterectomy     Family History   Problem Relation Age of Onset    Thyroid Disease Maternal Aunt     Thyroid Disease Maternal Aunt     Hypertension Mother     Hypertension Father        ALLERGIES AND MEDICATIONS:  Allergies-    Allergies   Allergen Reactions    Pcn [Penicillins] Hives and Rash       Medications-    Current Outpatient Medications on File Prior to Visit   Medication Sig Dispense Refill    sertraline (ZOLOFT) 100 mg tablet One and one half tablets (150) mg orally every day (new dose) 30 Tab 3    clobetasoL (TEMOVATE) 0.05 % ointment Apply  to affected area two (2) times a day. 15 g 3    methIMAzole (TAPAZOLE) 5 mg tablet Take 1 Tab by mouth daily. 90 Tab 1    cholecalciferol, vitamin D3, (VITAMIN D3 PO) Take 2,000 Units by mouth daily.  ARIPiprazole (ABILIFY) 5 mg tablet       [DISCONTINUED] HYDROcodone-acetaminophen (NORCO) 7.5-325 mg per tablet take 1 tablet by mouth every 6 hours if needed for pain      [DISCONTINUED] BORIC ACID by Does Not Apply route. (Patient not taking: Reported on 6/18/2021)      [DISCONTINUED] diclofenac EC (VOLTAREN) 75 mg EC tablet  (Patient not taking: Reported on 6/18/2021)       No current facility-administered medications on file prior to visit. REVIEW OF SYSTEMS (information comes from all available records and patient/family present with patient) :    Review of systems Per HPI.     VITAL SIGNS:    NO vitals signs reported by patient from current location this day. Wt Readings from Last 3 Encounters:   06/18/21 175 lb (79.4 kg)   05/12/21 176 lb (79.8 kg)   05/06/21 174 lb (78.9 kg)       BP Readings from Last 3 Encounters:   06/18/21 135/89   05/12/21 112/68   05/06/21 130/81         PHYSICAL EXAMINATION:     By  video and virtual technology we observed the following today;    General-patient seated on a couch, looks comfortable but has limited range of motion of her shoulder and neck. Skin-no bruising bleeding or rashes noted on visible areas of the face and neck  Neck-looks to be painful range of motion when she looks left to right and right to left. She also has no mass noted. Trachea appears midline. Lungs-normal chest excursion with no audible wheezing. Throat-no hoarseness raspiness or voice changes  Neuro-facial and neck no gross focal deficits      ASSESSMENT AND PLAN:  Discussion regarding today's visit with Raiza Jonh :   · She has had surgery, 8 weeks ago  · That seems to be improving, continue therapy and surgical care  · Follow-up with specialist regarding her other musculoskeletal/neurological issues  · Follow-up with us in 2 months see how she is doing  · Continue the antidepressants and psychiatric care  · No changes needed today. ICD-10-CM ICD-9-CM    1. Chronic bilateral thoracic back pain  M54.6 724.1     G89.29 338.29    2. Acute pain of left shoulder  M25.512 719.41            Other Discussion-  The patient and I also discussed the following; That this video visit does not replace nor is as accurate / reliable as a face to face visit with a medical professional/physician/provider and that all diagnoses are based on information given by patient /others representing patient if present and records available. Each diagnosis listed for today's visit including medications, treatment, testing such as labs, imagine, referrals and when to call regarding results and appointments. Reminded patient to keep any and all appointments with specialists, labs, imaging. Reminded patient to make sure we get copies of any specialists care, labs and imaging. Reminded patient to call of come by the office if there are any concerns, questions , comments or problems. The patient verbalized understanding of the care plan and all questions were answered to the patient's satisfaction prior to leaving the office. The patient was told that failure to comply with recommended testing could result in abnormal health consequences. The patient was instructed to have yearly routine health maintenance including but not limited to age appropriate vaccines, testing, screening exams. The patient actively participated in medical decision making. FOLLOW UP:    Patient is advised to keep all future appointments unless otherwise discussed and keep ALL appointments for other providers, specialists and testing if scheduled. Failure to do so could result in adverse health consequences. Follow-up and Dispositions    · Return in about 2 months (around 9/23/2021). This visit was completed using manually typed information and voice recognition software. There may be errors despite editing. This visit was completed using doxy. me which is audio and video technology that allows real time interaction with the patient.     Salvador Olmedo DO

## 2021-10-14 LAB
25(OH)D3+25(OH)D2 SERPL-MCNC: 14.5 NG/ML (ref 30–100)
T3 SERPL-MCNC: 110 NG/DL (ref 71–180)
T4 FREE SERPL-MCNC: 1.53 NG/DL (ref 0.82–1.77)
TSH SERPL DL<=0.005 MIU/L-ACNC: 0.68 UIU/ML (ref 0.45–4.5)

## 2021-10-18 ENCOUNTER — OFFICE VISIT (OUTPATIENT)
Dept: ENDOCRINOLOGY | Age: 46
End: 2021-10-18
Payer: COMMERCIAL

## 2021-10-18 VITALS
HEART RATE: 72 BPM | BODY MASS INDEX: 27.8 KG/M2 | OXYGEN SATURATION: 100 % | WEIGHT: 173 LBS | DIASTOLIC BLOOD PRESSURE: 91 MMHG | TEMPERATURE: 97.8 F | SYSTOLIC BLOOD PRESSURE: 135 MMHG | HEIGHT: 66 IN | RESPIRATION RATE: 18 BRPM

## 2021-10-18 DIAGNOSIS — E16.2 HYPOGLYCEMIA: ICD-10-CM

## 2021-10-18 DIAGNOSIS — E55.9 VITAMIN D DEFICIENCY: ICD-10-CM

## 2021-10-18 DIAGNOSIS — E05.90 HYPERTHYROIDISM: Primary | ICD-10-CM

## 2021-10-18 DIAGNOSIS — E05.00 GRAVES DISEASE: ICD-10-CM

## 2021-10-18 PROCEDURE — 99214 OFFICE O/P EST MOD 30 MIN: CPT | Performed by: INTERNAL MEDICINE

## 2021-10-18 RX ORDER — PROPRANOLOL HYDROCHLORIDE 10 MG/1
10 TABLET ORAL DAILY
COMMUNITY

## 2021-10-18 RX ORDER — BLOOD SUGAR DIAGNOSTIC
STRIP MISCELLANEOUS
Qty: 50 STRIP | Refills: 2 | Status: SHIPPED | OUTPATIENT
Start: 2021-10-18

## 2021-10-18 RX ORDER — LANCETS 33 GAUGE
EACH MISCELLANEOUS
Qty: 100 EACH | Refills: 2 | Status: SHIPPED | OUTPATIENT
Start: 2021-10-18

## 2021-10-18 NOTE — PROGRESS NOTES
Anson Borjas MD          Patient Information  Date:10/18/2021  Name : Lois Peacock 39 y.o.     YOB: 1975         Referred by: Aman Whitney DO         Chief Complaint   Patient presents with    Thyroid Problem       History of present illness    Lois Peacock is a 39 y.o. female  here for fu of thyroid. She was seen in 2017, was on methimazole. Lost insurance, off medications, restarted methimazole in March 2019 which she took it for less than 6 months, did not follow-up and self stopped  In October 2020 she presented to PCP with racing of the heart, nervousness, labs were suggestive of hyperthyroidism, she was restarted on 10 mg of methimazole  She is now on 5 mg of methimazole, taking the medication consistently except for 1 week  No fever or sore throat    Within half an hour after eating she feels weak, sweaty, symptoms get better after having a bowel movement. No chest pain, racing of the heart, sweating  No syncope  No relation to the food  Sometimes within 20 minutes she can have the symptoms    She had anemia in the past, status post hysterectomy in 2018      Prior hx       She was diagnosed with hyperthyroidism/Graves disease 10 years ago, intermittently on PTU, reports fluctuating weight. She attributed hair loss to PTU and hence stopped it. She had stopped antithyroid medications in the past thinking that is the cause for hair loss             FH of thyroid disease.     Wt Readings from Last 3 Encounters:   10/18/21 173 lb (78.5 kg)   06/18/21 175 lb (79.4 kg)   05/12/21 176 lb (79.8 kg)       Past Medical History:   Diagnosis Date    Dysphagia 11/4/2020    Hx traumatic fracture     Hyperthyroidism     Insomnia 11/4/2020    Vaginitis, atrophic        Current Outpatient Medications   Medication Sig    sertraline (ZOLOFT) 100 mg tablet One and one half tablets (150) mg orally every day (new dose)    clobetasoL (TEMOVATE) 0.05 % ointment Apply  to affected area two (2) times a day. (Patient taking differently: Apply  to affected area as needed.)    methIMAzole (TAPAZOLE) 5 mg tablet Take 1 Tab by mouth daily.  ARIPiprazole (ABILIFY) 5 mg tablet     cholecalciferol, vitamin D3, (VITAMIN D3 PO) Take 2,000 Units by mouth daily. (Patient not taking: Reported on 10/18/2021)     No current facility-administered medications for this visit. Review of Systems:  - Per HPI    Physical Examination:  - Blood pressure (!) 135/91, pulse 72, temperature 97.8 °F (36.6 °C), temperature source Temporal, resp. rate 18, height 5' 6\" (1.676 m), weight 173 lb (78.5 kg), SpO2 100 %. Body mass index is 27.92 kg/m². - General: pleasant, no distress, good eye contact  - HEENT: no exophthalmos, no periorbital edema, EOMI  - Neck: No thyromegaly  - CVS: S1-S2 regular  - RS: Normal respiratory effort  - Musculoskeletal: no tremors  - Neurological: alert and oriented  - Psychiatric: normal mood and affect  - Skin: Normal color    Data Reviewed:   Lab Results   Component Value Date/Time    TSH 0.677 10/13/2021 08:56 AM    T4, Free 1.53 10/13/2021 08:56 AM       Lab Results   Component Value Date/Time    WBC 7.5 04/15/2021 07:57 AM    HGB 13.0 04/15/2021 07:57 AM    HCT 38.8 04/15/2021 07:57 AM    PLATELET 635 15/00/6945 07:57 AM    MCV 82 04/15/2021 07:57 AM     [x] Reviewed labs    Assessment/Plan:     1. Hyperthyroidism    2. Graves disease      Grave's disease. She has intermittently been taking methimazole.   Methimazole since October 2020  Continue 5 mg  Biochemically euthyroid      Avoid skipping meals, avoid artificial sweeteners    Palpitations: Improved, on propranolol as per cardiologist    Vitamin D deficiency    Postprandial symptoms: Happening within half an hour which makes post meal hypoglycemia less likely  Given the meter, to check the blood glucose before and after a meal.  Mixed protein meal.  To maintain the food log, rule out hypoglycemia,? Gastritis and a vagal response  Pepcid          Thank you for allowing me to participate in the care of this patient.     Karl Marin MD      Patient verbalized understanding

## 2021-10-18 NOTE — PROGRESS NOTES
Dafne Lopez is a 39 y.o. female here for   Chief Complaint   Patient presents with    Thyroid Problem       1. Have you been to the ER, urgent care clinic since your last visit? Hospitalized since your last visit? -no    2. Have you seen or consulted any other health care providers outside of the [address] [place] [address] since your last visit?   Include any pap smears or colon screening.-Ortho

## 2021-10-18 NOTE — LETTER
10/18/2021    Patient: Helio Vásquez   YOB: 1975   Date of Visit: 10/18/2021     Terrence Bonner DO  [address]  Via In [facility] [address]    Dear Terrence Bonnre DO,      Thank you for referring Ms. Rossy Rodriguez to [id number] [place] [address] for evaluation. My notes for this consultation are attached. If you have questions, please do not hesitate to call me. I look forward to following your patient along with you.       Sincerely,    Juliano Puri MD

## 2021-11-19 ENCOUNTER — VIRTUAL VISIT (OUTPATIENT)
Dept: FAMILY MEDICINE CLINIC | Age: 46
End: 2021-11-19
Payer: COMMERCIAL

## 2021-11-19 DIAGNOSIS — R00.2 HEART PALPITATIONS: Primary | ICD-10-CM

## 2021-11-19 DIAGNOSIS — R19.7 DIARRHEA, UNSPECIFIED TYPE: ICD-10-CM

## 2021-11-19 DIAGNOSIS — E55.9 VITAMIN D DEFICIENCY: ICD-10-CM

## 2021-11-19 PROCEDURE — 99442 PR PHYS/QHP TELEPHONE EVALUATION 11-20 MIN: CPT | Performed by: FAMILY MEDICINE

## 2021-11-19 RX ORDER — ERGOCALCIFEROL 1.25 MG/1
50000 CAPSULE ORAL
Qty: 12 CAPSULE | Refills: 1 | Status: SHIPPED | OUTPATIENT
Start: 2021-11-19 | End: 2022-03-21 | Stop reason: ALTCHOICE

## 2021-11-19 NOTE — PROGRESS NOTES
Identifying Data:  Rosibel Rodriguez  39 y.o. female     Due to current [place] [address] asks for a telephone assessment today,  11/19/21    she understands this is a billable charge and that they may receive a bill depending on type of insurance. Patient agrees to proceed. CC:  Chief Complaint   Patient presents with    Palpitations          HPI:   has a past medical history of Dysphagia (11/4/2020), traumatic fracture, Hyperthyroidism, Insomnia (11/4/2020), and Vaginitis, atrophic.  she asks for a call regarding Several concerns including Why is she aching all the time, has shoulder issues, just had surgery and both shoulders are hurting. She has foot pain and burning, has seen the Podiatrist.  Has back pain. Sees Endo for hyperthyroidism with normalized tsh but vitamin D was 14.5. Did not get any otc as advised. Also complains of palpitations after eating, some diarrhea, cramping. NO fevers, but sometimes gets chills and sweats. This has been going of for year or two just getting worse. Was given meter to check fingers stick glucose after eating BUT did NOT do as of yet. I have personally reviewed the patients PAST MEDICAL HISTORY, MEDICATIONS, ALLERGIES, SOCIAL HISTORY, SURGICAL HISTORY and FAMILY HISTORY which is listed in Epic and updated as indicated. REVIEW OF SYSTEMS:  ROS        PHYSICAL:  NO physical today as this is a telephonic assessment. ASSESSMENT AND PLAN:   *    ICD-10-CM ICD-9-CM    1.  Heart palpitations  M46.5 463.1 METABOLIC PANEL, COMPREHENSIVE      CBC WITH AUTOMATED DIFF      CELIAC ANTIBODY PROFILE      FOOD ALLERGY PROFILE      MAGNESIUM   2. Diarrhea, unspecified type  V62.0 656.12 METABOLIC PANEL, COMPREHENSIVE      CBC WITH AUTOMATED DIFF      CELIAC ANTIBODY PROFILE      FOOD ALLERGY PROFILE      MAGNESIUM   3. Vitamin D deficiency  E55.9 268.9 ergocalciferol (ERGOCALCIFEROL) 1,250 mcg (50,000 unit) capsule     -No this patient has vitamin D deficiency, needs replacement that may help a little. -We will update metabolic panel electrolytes sugar and liver as well as renal function.  -We will also order a celiac panel and food allergy panel.  -We will send her to gastroenterology for further opinion and work-up if indicated. This assessment has been completed telephonically Due to the COVID-19 Pandemic and the patient realizes it does NOT replace in persons, face to face visit with a healthcare provider.  Time spent on assessment was : 15 Minutes      Darryn Hoyt DO

## 2021-11-19 NOTE — PATIENT INSTRUCTIONS
General Health and concerns:  HEART HEALTHY DIET:  A heart healthy diet is one that is low in cholesterol (less than 300 mg daily), fat (less than 80 g daily) . You should also minimize carbohydrates / sugars (less amounts of breads, pastas, potato and potato products and sugary foods/snacks, cookies, cakes, etc) . Try to eat whole wheat/multigrain breads and pastas and eat more vegetables. Cook with olive oil (or no oil) and grill, bake, broil or boil foods. Less red meat and more chicken , fish and lean cuts of beef (limited). 7599-4836 calories per day is sufficient 8982-4406 is acceptable for weight loss. EXERCISE:  You should do exercise 3-5 days per week (minimum) to include increasing your heart rate for 30 to 45 minutes. At least a pace of a brisk walk should do that. This build up your heart and lung endurance and muscles and helps many function of the body. OTHER:        Routine Health maintenance: You need to get a yearly follow up/physical exam to review, discuss age and gender appropriate exams, labs, vaccines and screening tests. This includes cardiovascular health risk, cancer screens and other wild related topics. Medications-take all medications as directed. Please do not stop unless you talk to your doctor or health care provider first. Report any problems immediately. Referrals: if you have been given a referral, please call the office if you do not hear from provider in one week. You may make the appointment yourself. Please keep all appointments with specialists and ask them to send their notes, thoughts, recommendations to us , as your PCP. Imaging/Labs:  Be sure to get these images in a timely manner. IF your test must be scheduled, let us know if you need help getting this done and if you do not hear from that provider in a week , call us or them.   BE SURE to call the office if you do not hear regarding the results in one week after the test is performed Image or lab). It is our intention to inform you of the results ALWAYS, even if normal you should get a notification (Call, portal message). PLEASE ambar if you do not get the results. PLEASE follow all recommendations and call/come in /ask questions if you do not understand of if problems develop after or in between visits. Failure to comply with recommended health care advise could result in serious health consequences. You have had a telephone interaction between you and the physician. It does NOT replace personal, face to face , in person visits with a health care provider. Please note that certain diagnoses and advised comes from knowledge of medical conditions and depends HEAVILY upon information you provide the physician. IF there are any concerns or you do not improve you should be seen in person, face to face by a healthcare provider. Thank you for choosing our practice and please let us know how we can help you feel better and stay well!

## 2021-11-28 LAB
ALBUMIN SERPL-MCNC: 4.3 G/DL (ref 3.8–4.8)
ALBUMIN/GLOB SERPL: 1.2 {RATIO} (ref 1.2–2.2)
ALP SERPL-CCNC: 100 IU/L (ref 44–121)
ALT SERPL-CCNC: 9 IU/L (ref 0–32)
AST SERPL-CCNC: 14 IU/L (ref 0–40)
BASOPHILS # BLD AUTO: 0.1 X10E3/UL (ref 0–0.2)
BASOPHILS NFR BLD AUTO: 1 %
BILIRUB SERPL-MCNC: 0.7 MG/DL (ref 0–1.2)
BUN SERPL-MCNC: 10 MG/DL (ref 6–24)
BUN/CREAT SERPL: 15 (ref 9–23)
CALCIUM SERPL-MCNC: 9.3 MG/DL (ref 8.7–10.2)
CHLORIDE SERPL-SCNC: 101 MMOL/L (ref 96–106)
CLAM IGE QN: <0.1 KU/L
CO2 SERPL-SCNC: 25 MMOL/L (ref 20–29)
CODFISH IGE QN: <0.1 KU/L
CORN IGE QN: <0.1 KU/L
COW MILK IGE QN: <0.1 KU/L
CREAT SERPL-MCNC: 0.67 MG/DL (ref 0.57–1)
EGG WHITE IGE QN: <0.1 KU/L
EOSINOPHIL # BLD AUTO: 0.2 X10E3/UL (ref 0–0.4)
EOSINOPHIL NFR BLD AUTO: 2 %
ERYTHROCYTE [DISTWIDTH] IN BLOOD BY AUTOMATED COUNT: 13.1 % (ref 11.7–15.4)
GLIADIN PEPTIDE IGA SER-ACNC: 4 UNITS (ref 0–19)
GLIADIN PEPTIDE IGG SER-ACNC: 2 UNITS (ref 0–19)
GLOBULIN SER CALC-MCNC: 3.6 G/DL (ref 1.5–4.5)
GLUCOSE SERPL-MCNC: 86 MG/DL (ref 65–99)
HCT VFR BLD AUTO: 37.1 % (ref 34–46.6)
HGB BLD-MCNC: 12.6 G/DL (ref 11.1–15.9)
IGA SERPL-MCNC: 152 MG/DL (ref 87–352)
IMM GRANULOCYTES # BLD AUTO: 0 X10E3/UL (ref 0–0.1)
IMM GRANULOCYTES NFR BLD AUTO: 0 %
LYMPHOCYTES # BLD AUTO: 2.3 X10E3/UL (ref 0.7–3.1)
LYMPHOCYTES NFR BLD AUTO: 23 %
Lab: NORMAL
MAGNESIUM SERPL-MCNC: 2 MG/DL (ref 1.6–2.3)
MCH RBC QN AUTO: 26.9 PG (ref 26.6–33)
MCHC RBC AUTO-ENTMCNC: 34 G/DL (ref 31.5–35.7)
MCV RBC AUTO: 79 FL (ref 79–97)
MONOCYTES # BLD AUTO: 0.8 X10E3/UL (ref 0.1–0.9)
MONOCYTES NFR BLD AUTO: 8 %
NEUTROPHILS # BLD AUTO: 6.9 X10E3/UL (ref 1.4–7)
NEUTROPHILS NFR BLD AUTO: 66 %
PEANUT IGE QN: <0.1 KU/L
PLATELET # BLD AUTO: 300 X10E3/UL (ref 150–450)
POTASSIUM SERPL-SCNC: 4.1 MMOL/L (ref 3.5–5.2)
PROT SERPL-MCNC: 7.9 G/DL (ref 6–8.5)
RBC # BLD AUTO: 4.68 X10E6/UL (ref 3.77–5.28)
SCALLOP IGE QN: <0.1 KU/L
SESAME SEED IGE QN: <0.1 KU/L
SHRIMP IGE QN: <0.1 KU/L
SODIUM SERPL-SCNC: 137 MMOL/L (ref 134–144)
SOYBEAN IGE QN: <0.1 KU/L
TTG IGA SER-ACNC: <2 U/ML (ref 0–3)
TTG IGG SER-ACNC: <2 U/ML (ref 0–5)
WALNUT IGE QN: <0.1 KU/L
WBC # BLD AUTO: 10.4 X10E3/UL (ref 3.4–10.8)
WHEAT IGE QN: <0.1 KU/L

## 2021-12-21 DIAGNOSIS — E05.90 HYPERTHYROIDISM: ICD-10-CM

## 2021-12-21 DIAGNOSIS — E05.00 GRAVES DISEASE: ICD-10-CM

## 2021-12-21 RX ORDER — METHIMAZOLE 5 MG/1
TABLET ORAL
Qty: 90 TABLET | Refills: 1 | Status: SHIPPED | OUTPATIENT
Start: 2021-12-21

## 2022-01-24 ENCOUNTER — VIRTUAL VISIT (OUTPATIENT)
Dept: FAMILY MEDICINE CLINIC | Age: 47
End: 2022-01-24
Payer: COMMERCIAL

## 2022-01-24 DIAGNOSIS — L30.9 ECZEMA, UNSPECIFIED TYPE: ICD-10-CM

## 2022-01-24 PROBLEM — M54.9 BACK PAIN: Status: ACTIVE | Noted: 2020-10-07

## 2022-01-24 PROBLEM — M54.12 CERVICAL RADICULOPATHY: Status: ACTIVE | Noted: 2021-04-23

## 2022-01-24 PROBLEM — F32.A DEPRESSED: Status: ACTIVE | Noted: 2021-03-05

## 2022-01-24 PROBLEM — S43.439A GLENOID LABRUM TEAR: Status: ACTIVE | Noted: 2021-05-10

## 2022-01-24 PROBLEM — M54.2 CERVICALGIA: Status: ACTIVE | Noted: 2021-04-23

## 2022-01-24 PROBLEM — M25.312 INSTABILITY OF BOTH SHOULDER JOINTS: Status: ACTIVE | Noted: 2021-05-10

## 2022-01-24 PROBLEM — G56.02 CARPAL TUNNEL SYNDROME OF LEFT WRIST: Status: ACTIVE | Noted: 2021-05-27

## 2022-01-24 PROBLEM — M25.311 INSTABILITY OF BOTH SHOULDER JOINTS: Status: ACTIVE | Noted: 2021-05-10

## 2022-01-24 PROCEDURE — 99214 OFFICE O/P EST MOD 30 MIN: CPT | Performed by: NURSE PRACTITIONER

## 2022-01-24 RX ORDER — GABAPENTIN 300 MG/1
CAPSULE ORAL
COMMUNITY
Start: 2021-08-03

## 2022-01-24 RX ORDER — TRAZODONE HYDROCHLORIDE 100 MG/1
TABLET ORAL
COMMUNITY
Start: 2022-01-06

## 2022-01-24 RX ORDER — CLOBETASOL PROPIONATE 0.5 MG/G
OINTMENT TOPICAL 2 TIMES DAILY
Qty: 15 G | Refills: 3 | Status: SHIPPED | OUTPATIENT
Start: 2022-01-24

## 2022-01-24 RX ORDER — MELOXICAM 15 MG/1
15 TABLET ORAL DAILY
COMMUNITY
Start: 2021-03-05

## 2022-01-24 RX ORDER — DICLOFENAC SODIUM 10 MG/G
2 GEL TOPICAL 4 TIMES DAILY
COMMUNITY
Start: 2021-05-05

## 2022-01-24 NOTE — PROGRESS NOTES
Consuelo Jewell (: 1975) is a 55 y.o. female, established patient, here for evaluation of the following chief complaint(s):   Follow-up       ASSESSMENT/PLAN:  Below is the assessment and plan developed based on review of pertinent history, labs, studies, and medications. 1. Eczema, unspecified type  -     clobetasoL (TEMOVATE) 0.05 % ointment; Apply  to affected area two (2) times a day., Normal, Disp-15 g, R-3      No follow-ups on file. SUBJECTIVE/OBJECTIVE:    HPI     Pt presented for f/u. She is new to me. Her PCP has been Dr. Eugenie Gresham. Pt stated that she needed a refill on her eczema medication. She also stated that she has some long term disability paperwork that needs updated from her job. She has been out of work since 2021 due to a variety of health issues. Medical history is significant for depression, Graves disease, anxiety, depression, insomnia, back pain, carpal tunnel syndrome of L wrist, cervical radiculopathy, herniation of intervertebral disc, and instability of both shoulder joints. She is under care of a psychiatrist for her mental health issues and is ordering Abilify and sertraline. She is feeling ok mental healthwise at this time. She sees psychiatrist every 3 months. Pt has surgery on her R shoulder in Oct 2021 -R shoulder arthroscopy, SAD, anterior/inferior labral repair. She is under care of ortho surgeon Dr. Aman Perera. She continues to receive PT for her shoulder. Dr. Aman Perera is ordering gabapentin for her carpal tunnel symptoms which is helpful for symptoms. She also wears a wrist brace at night. Pt also has GERD symptoms. She was worked up for celiac disease and food allergies but all testing was unremarkable. She is scheduled to have an abd US on 2022 and expects to have an endoscopy in the near future by GI . Endocrinologist is Dr. Latha Krueger who is following her for Graves disease.  Last thyroid labs were in therapeutic range and she continues on methimazole. Currently stable. Review of Systems   Constitutional: Negative for chills and fever. HENT: Negative for congestion, ear pain, postnasal drip, sinus pressure and sinus pain. Eyes: Negative for pain and itching. Respiratory: Negative for cough, shortness of breath and wheezing. Cardiovascular: Positive for palpitations. Negative for chest pain. Improved   Gastrointestinal: Positive for constipation, diarrhea and nausea. Negative for abdominal pain and vomiting. Gerd   Genitourinary: Negative for dysuria and frequency. Musculoskeletal: Positive for arthralgias. Negative for myalgias. Multiple areas of joint pain- back, neck herniated discs, carpal tunnel both wrists, shoulders. Skin: Positive for rash. Neurological: Positive for numbness. Negative for dizziness, weakness, light-headedness and headaches. Numbness and tingling in fingers and feet at times. Psychiatric/Behavioral: Positive for dysphoric mood and sleep disturbance. The patient is nervous/anxious. Sleeps 4 hours a night. Trouble getting to sleep and staying asleep.         No data recorded     Physical Exam    [INSTRUCTIONS:  \"[x]\" Indicates a positive item  \"[]\" Indicates a negative item  -- DELETE ALL ITEMS NOT EXAMINED]    Constitutional: [x] Appears well-developed and well-nourished [x] No apparent distress      [] Abnormal -     Mental status: [x] Alert and awake  [x] Oriented to person/place/time [x] Able to follow commands    [] Abnormal -     Eyes:   EOM    [x]  Normal    [] Abnormal -   Sclera  [x]  Normal    [] Abnormal -          Discharge [x]  None visible   [] Abnormal -     HENT: [x] Normocephalic, atraumatic  [] Abnormal -   [] Mouth/Throat: Mucous membranes are moist    External Ears [x] Normal  [] Abnormal -    Neck: [x] No visualized mass [] Abnormal -     Pulmonary/Chest: [x] Respiratory effort normal   [x] No visualized signs of difficulty breathing or respiratory distress        [] Abnormal -      Musculoskeletal:   [] Normal gait with no signs of ataxia         [x] Normal range of motion of neck        [] Abnormal -     Neurological:        [x] No Facial Asymmetry (Cranial nerve 7 motor function) (limited exam due to video visit)          [x] No gaze palsy        [] Abnormal -          Skin:        [x] No significant exanthematous lesions or discoloration noted on facial skin         [] Abnormal -            Psychiatric:       [x] Normal Affect [] Abnormal -        [x] No Hallucinations    Other pertinent observable physical exam findings:-            Dempseydami Hogan, was evaluated through a synchronous (real-time) audio-video encounter. The patient (or guardian if applicable) is aware that this is a billable service, which includes applicable co-pays. Verbal consent to proceed has been obtained. The visit was conducted pursuant to the emergency declaration under the [address] [place] [address], [address] [place] [address] authority and the [employer] Act. Patient identification was verified, and a caregiver was present when appropriate. The patient was located at home in a state where the provider was licensed to provide care. An electronic signature was used to authenticate this note.   -- Dyllan Bullock NP

## 2022-02-12 LAB
25(OH)D3+25(OH)D2 SERPL-MCNC: 28.9 NG/ML (ref 30–100)
T4 FREE SERPL-MCNC: 0.97 NG/DL (ref 0.82–1.77)
TSH SERPL DL<=0.005 MIU/L-ACNC: 0.67 UIU/ML (ref 0.45–4.5)

## 2022-02-18 ENCOUNTER — OFFICE VISIT (OUTPATIENT)
Dept: ENDOCRINOLOGY | Age: 47
End: 2022-02-18
Payer: COMMERCIAL

## 2022-02-18 VITALS
DIASTOLIC BLOOD PRESSURE: 80 MMHG | WEIGHT: 166 LBS | TEMPERATURE: 97.8 F | SYSTOLIC BLOOD PRESSURE: 106 MMHG | HEIGHT: 66 IN | RESPIRATION RATE: 18 BRPM | OXYGEN SATURATION: 96 % | BODY MASS INDEX: 26.68 KG/M2 | HEART RATE: 82 BPM

## 2022-02-18 DIAGNOSIS — E05.00 GRAVES DISEASE: ICD-10-CM

## 2022-02-18 DIAGNOSIS — E05.90 HYPERTHYROIDISM: Primary | ICD-10-CM

## 2022-02-18 DIAGNOSIS — R00.2 PALPITATIONS: ICD-10-CM

## 2022-02-18 PROCEDURE — 99214 OFFICE O/P EST MOD 30 MIN: CPT | Performed by: INTERNAL MEDICINE

## 2022-02-18 NOTE — LETTER
2/18/2022    Patient: Ke Hogan   YOB: 1975   Date of Visit: 2/18/2022     Derek Espinosa DO  [address] [address]  Via Fax: [phone]    Dear Derek Espinosa DO,      Thank you for referring Ms. Rossy Rodriguez to [id number] [place] [address] for evaluation. My notes for this consultation are attached. If you have questions, please do not hesitate to call me. I look forward to following your patient along with you.       Sincerely,    Ning Rice MD VTE ppx: HSQ  Diet: low sodium

## 2022-02-18 NOTE — PROGRESS NOTES
Mati Holder MD          Patient Information  Date:2/18/2022  Name : Bar Duarte 55 y.o.     YOB: 1975         Referred by: Mirna Abdi DO         Chief Complaint   Patient presents with    Thyroid Problem       History of present illness    Bar Duarte is a 55 y.o. female  here for fu of thyroid. She was seen in 2017, was on methimazole. Lost insurance, off medications, restarted methimazole in March 2019 which she took it for less than 6 months, did not follow-up and self stopped  In October 2020 she presented to PCP with racing of the heart, nervousness, labs were suggestive of hyperthyroidism, she was restarted on 10 mg of methimazole  She has been taking methimazole since October 2020  Pressure behind the left eye, diplopia  No fever or sore throat  Saw GI, scheduled for cholecystectomy  Prior history  Within half an hour after eating she feels weak, sweaty, symptoms get better after having a bowel movement. No chest pain, racing of the heart, sweating  No syncope  No relation to the food  Sometimes within 20 minutes she can have the symptoms    She had anemia in the past, status post hysterectomy in 2018      Prior hx       She was diagnosed with hyperthyroidism/Graves disease 10 years ago, intermittently on PTU, reports fluctuating weight. She attributed hair loss to PTU and hence stopped it. She had stopped antithyroid medications in the past thinking that is the cause for hair loss             FH of thyroid disease.     Wt Readings from Last 3 Encounters:   02/18/22 166 lb (75.3 kg)   10/18/21 173 lb (78.5 kg)   06/18/21 175 lb (79.4 kg)       Past Medical History:   Diagnosis Date    Dysphagia 11/4/2020    Hx traumatic fracture     Hyperthyroidism     Insomnia 11/4/2020    Vaginitis, atrophic        Current Outpatient Medications   Medication Sig    diclofenac (VOLTAREN) 1 % gel Apply 2 g to affected area four (4) times daily.  gabapentin (NEURONTIN) 300 mg capsule take 2 capsules by mouth at bedtime for 4 days then ADD 1 CAPSULE MIDDAY    meloxicam (MOBIC) 15 mg tablet Take 15 mg by mouth daily.  traZODone (DESYREL) 100 mg tablet take 1-2 tablets by mouth at bedtime if needed for sleep    clobetasoL (TEMOVATE) 0.05 % ointment Apply  to affected area two (2) times a day.  methIMAzole (TAPAZOLE) 5 mg tablet take 1 tablet by mouth once daily --CHANGE IN DOSE TO 5 MG    ergocalciferol (ERGOCALCIFEROL) 1,250 mcg (50,000 unit) capsule Take 1 Capsule by mouth every seven (7) days.  propranoloL (INDERAL) 10 mg tablet Take 10 mg by mouth daily.  glucose blood VI test strips (OneTouch Verio test strips) strip Check blood glucose twice daily    lancets (OneTouch Delica Plus Lancet) 33 gauge misc To check blood glucose twice daily    sertraline (ZOLOFT) 100 mg tablet One and one half tablets (150) mg orally every day (new dose)    ARIPiprazole (ABILIFY) 5 mg tablet     cholecalciferol, vitamin D3, (VITAMIN D3 PO) Take 2,000 Units by mouth daily. (Patient not taking: Reported on 10/18/2021)     No current facility-administered medications for this visit. Review of Systems:  - Per HPI    Physical Examination:  - Blood pressure 106/80, pulse 82, temperature 97.8 °F (36.6 °C), temperature source Temporal, resp. rate 18, height 5' 6\" (1.676 m), weight 166 lb (75.3 kg), SpO2 96 %. Body mass index is 26.79 kg/m².   - General: pleasant, no distress, good eye contact  - HEENT: no exophthalmos, no periorbital edema, EOMI  - Neck: No thyromegaly  - CVS: S1-S2 regular  - RS: Normal respiratory effort  - Musculoskeletal: no tremors  - Neurological: alert and oriented  - Psychiatric: normal mood and affect  - Skin: Normal color    Data Reviewed:   Lab Results   Component Value Date/Time    TSH 0.669 02/11/2022 10:59 AM    T4, Free 0.97 02/11/2022 10:59 AM       Lab Results   Component Value Date/Time    WBC 10.4 11/24/2021 11:23 AM    HGB 12.6 11/24/2021 11:23 AM    HCT 37.1 11/24/2021 11:23 AM    PLATELET 798 89/98/0655 11:23 AM    MCV 79 11/24/2021 11:23 AM     [x] Reviewed labs    Assessment/Plan:     1. Hyperthyroidism    2. Graves disease      Grave's disease. She has intermittently been taking methimazole. Methimazole since October 2020  Continue 5 mg  Biochemically euthyroid    Pressure behind the left eye: No obvious exophthalmos, extraocular movements intact  Selenium  If it gets worse follow-up with ophthalmology, has an ophthalmologist appointment in April 2022      Avoid skipping meals, avoid artificial sweeteners    Palpitations: Improved, on propranolol as per cardiologist    Vitamin D deficiency    Postprandial symptoms: Happening within half an hour which makes post meal hypoglycemia less likely  Given the meter, to check the blood glucose before and after a meal.  Mixed protein meal.  To maintain the food log, rule out hypoglycemia,? Gastritis and a vagal response  Pepcid          Thank you for allowing me to participate in the care of this patient.     Kendrick Brandt MD      Patient verbalized understanding

## 2022-02-18 NOTE — PROGRESS NOTES
Paris Mancera is a 55 y.o. female here for   Chief Complaint   Patient presents with    Thyroid Problem       1. Have you been to the ER, urgent care clinic since your last visit? Hospitalized since your last visit? -no    2. Have you seen or consulted any other health care providers outside of the [address] [place] [address] since your last visit? Include any pap smears or colon screening. -[facility] health docs

## 2022-03-18 PROBLEM — F41.9 ANXIETY: Status: ACTIVE | Noted: 2020-10-01

## 2022-03-18 PROBLEM — M54.9 BACK PAIN: Status: ACTIVE | Noted: 2020-10-07

## 2022-03-18 PROBLEM — M54.12 CERVICAL RADICULOPATHY: Status: ACTIVE | Noted: 2021-04-23

## 2022-03-18 PROBLEM — F32.9 MAJOR DEPRESSIVE DISORDER WITH SINGLE EPISODE: Status: ACTIVE | Noted: 2020-10-15

## 2022-03-19 PROBLEM — M54.2 CERVICALGIA: Status: ACTIVE | Noted: 2021-04-23

## 2022-03-19 PROBLEM — S43.439A GLENOID LABRUM TEAR: Status: ACTIVE | Noted: 2021-05-10

## 2022-03-19 PROBLEM — G56.02 CARPAL TUNNEL SYNDROME OF LEFT WRIST: Status: ACTIVE | Noted: 2021-05-27

## 2022-03-19 PROBLEM — G47.00 INSOMNIA: Status: ACTIVE | Noted: 2020-11-04

## 2022-03-19 PROBLEM — F32.A DEPRESSED: Status: ACTIVE | Noted: 2021-03-05

## 2022-03-19 PROBLEM — L30.9 ECZEMA: Status: ACTIVE | Noted: 2020-10-01

## 2022-03-20 PROBLEM — F33.1 DEPRESSION, MAJOR, RECURRENT, MODERATE (HCC): Status: ACTIVE | Noted: 2020-10-15

## 2022-03-20 PROBLEM — R13.10 DYSPHAGIA: Status: ACTIVE | Noted: 2020-11-04

## 2022-03-20 PROBLEM — M25.311 INSTABILITY OF BOTH SHOULDER JOINTS: Status: ACTIVE | Noted: 2021-05-10

## 2022-03-20 PROBLEM — M25.312 INSTABILITY OF BOTH SHOULDER JOINTS: Status: ACTIVE | Noted: 2021-05-10

## 2022-03-21 ENCOUNTER — TELEPHONE (OUTPATIENT)
Dept: ENT CLINIC | Age: 47
End: 2022-03-21

## 2022-03-21 ENCOUNTER — VIRTUAL VISIT (OUTPATIENT)
Dept: FAMILY MEDICINE CLINIC | Age: 47
End: 2022-03-21
Payer: COMMERCIAL

## 2022-03-21 DIAGNOSIS — R22.1 NECK MASS: Primary | ICD-10-CM

## 2022-03-21 PROCEDURE — 99213 OFFICE O/P EST LOW 20 MIN: CPT | Performed by: NURSE PRACTITIONER

## 2022-03-21 NOTE — PROGRESS NOTES
Deon To (: 1975) is a 55 y.o. female, established patient, here for evaluation of the following chief complaint(s): Mass       ASSESSMENT/PLAN:  Below is the assessment and plan developed based on review of pertinent history, labs, studies, and medications. 1. Neck mass  -     REFERRAL TO ENT-OTOLARYNGOLOGY      Return if symptoms worsen or fail to improve. 1. Neck mass  Patient advised to take over-the-counter pain medicine and can use heat to the areas where she has a lump for comfort measure. Will review consult note when available. - REFERRAL TO ENT-OTOLARYNGOLOGY          SUBJECTIVE/OBJECTIVE:    HPI     Patient presented with complaints of pain in the left side of her neck and also pain in her tongue area on the left side of her neck. She stated that she can feel a lump on the left side of her neck and she has had the lump for about 3 weeks. She also says she has a lump under L arm. Apparently she called ENT office for appt -did not have a referral.  Patient is not sure she needs a referral but just in case she would like 1 put in for her. She already has an appointment for 3/22/2022 with NP Ms. Parminder Freeman. A virtual appointment is not ideal for patient's symptoms but has an appointment set up for further evaluation. Review of Systems   Constitutional: Positive for fatigue. Negative for chills and fever. Patient says her brain is foggy. HENT: Negative for congestion, ear pain, postnasal drip, sinus pressure and sinus pain. Respiratory: Negative for cough, shortness of breath and wheezing. Cardiovascular: Negative for chest pain and palpitations. Gastrointestinal: Positive for nausea. Negative for abdominal pain, blood in stool, diarrhea and vomiting. Genitourinary: Negative for dysuria and frequency. Musculoskeletal: Positive for back pain, myalgias and neck pain. Negative for arthralgias.         Muscle aches  Lower and upper back pain and between the shoulder blades. Skin: Negative for rash. Neurological: Positive for light-headedness and numbness. Negative for dizziness, weakness and headaches. Numbness in hands and in feet on medial side   Psychiatric/Behavioral: Negative for dysphoric mood and sleep disturbance. The patient is not nervous/anxious. Physical Exam    [INSTRUCTIONS:  \"[x]\" Indicates a positive item  \"[]\" Indicates a negative item  -- DELETE ALL ITEMS NOT EXAMINED]    Constitutional: [x] Appears well-developed and well-nourished [x] No apparent distress      [] Abnormal -     Mental status: [x] Alert and awake  [x] Oriented to person/place/time [x] Able to follow commands    [] Abnormal -     Eyes:   EOM    [x]  Normal    [] Abnormal -   Sclera  [x]  Normal    [] Abnormal -          Discharge [x]  None visible   [] Abnormal -     HENT: [x] Normocephalic, atraumatic  [] Abnormal -   [] Mouth/Throat: Mucous membranes are moist    External Ears [x] Normal  [] Abnormal -    Neck: [x] No visualized mass [] Abnormal -     Pulmonary/Chest: [x] Respiratory effort normal   [x] No visualized signs of difficulty breathing or respiratory distress        [] Abnormal -      Musculoskeletal:   [x] Normal gait with no signs of ataxia         [x] Normal range of motion of neck        [] Abnormal -     Neurological:        [x] No Facial Asymmetry (Cranial nerve 7 motor function) (limited exam due to video visit)          [x] No gaze palsy        [] Abnormal -          Skin:        [x] No significant exanthematous lesions or discoloration noted on facial skin         [] Abnormal -            Psychiatric:       [x] Normal Affect [] Abnormal -        [x] No Hallucinations        Rossy Rodriguez was evaluated through a synchronous (real-time) audio-video encounter. The patient (or guardian if applicable) is aware that this is a billable service, which includes applicable co-pays. Verbal consent to proceed has been obtained.  The visit was conducted pursuant to the emergency declaration under the [facility], [facility] authority and the [employer] Act. Patient identification was verified, and a caregiver was present when appropriate. The patient was located at home in a state where the provider was licensed to provide care. An electronic signature was used to authenticate this note.   -- Nickolas Luna NP

## 2022-03-21 NOTE — TELEPHONE ENCOUNTER
Tried calling Nathanael Rinaldi  to confirm next appointment, Left voicemail for patient to return call.

## 2022-03-22 ENCOUNTER — OFFICE VISIT (OUTPATIENT)
Dept: ENT CLINIC | Age: 47
End: 2022-03-22
Payer: COMMERCIAL

## 2022-03-22 VITALS
DIASTOLIC BLOOD PRESSURE: 70 MMHG | WEIGHT: 166 LBS | TEMPERATURE: 98.9 F | SYSTOLIC BLOOD PRESSURE: 120 MMHG | RESPIRATION RATE: 16 BRPM | HEIGHT: 66 IN | OXYGEN SATURATION: 100 % | BODY MASS INDEX: 26.68 KG/M2 | HEART RATE: 75 BPM

## 2022-03-22 DIAGNOSIS — K11.20 SIALADENITIS: Primary | ICD-10-CM

## 2022-03-22 DIAGNOSIS — J35.8 TONSIL STONE: ICD-10-CM

## 2022-03-22 PROCEDURE — 99204 OFFICE O/P NEW MOD 45 MIN: CPT | Performed by: NURSE PRACTITIONER

## 2022-03-22 RX ORDER — DOXYCYCLINE 100 MG/1
100 CAPSULE ORAL 2 TIMES DAILY
Qty: 28 CAPSULE | Refills: 0 | Status: SHIPPED | OUTPATIENT
Start: 2022-03-22 | End: 2022-04-05

## 2022-03-22 NOTE — PROGRESS NOTES
Visit Vitals  /70 (BP 1 Location: Left upper arm, BP Patient Position: Sitting, BP Cuff Size: Large adult)   Pulse 75   Temp 98.9 °F (37.2 °C) (Temporal)   Resp 16   Ht 5' 6\" (1.676 m)   Wt 166 lb (75.3 kg)   SpO2 100%   BMI 26.79 kg/m²     Chief Complaint   Patient presents with    New Patient     knot on throat

## 2022-03-22 NOTE — PROGRESS NOTES
Otolaryngology-Head and Neck Surgery  New Patient Visit     Patient: Yolanda Almonte  YOB: 1975  MRN: [id number]  Date of Service: 3/22/2022    Chief Complaint: Left otalgia    History of Present Illness: Yolanda Almonte is a 55y.o. year old female who presents today for discussion of      Reports left swollen glands and otalgia for 3 weeks    Denies congestion, PND, voice changes, recent dental issues, hearing changes  +rhinorrhea, difficulty swallowing    Tried hot tea, warm compress    Also complaints of chronic tonsil stones and strep  Strep infections have occurred multiple times per year until past 2 years  Tonsil stones are daily occurrence  +foul breath  Uses peroxide and removes as a daily ritual    No ENT surgical Hx    Past Medical History:  Past Medical History:   Diagnosis Date    Dysphagia 11/4/2020    Hx traumatic fracture     Hyperthyroidism     Insomnia 11/4/2020    Vaginitis, atrophic        Past Surgical History:   Past Surgical History:   Procedure Laterality Date    HX HYSTERECTOMY  2017    HX OTHER SURGICAL      bladder had to be fixed it was cut during hysterectomy    HX ROTATOR CUFF REPAIR Right 10/2021       Medications:   Current Outpatient Medications   Medication Instructions    clobetasoL (TEMOVATE) 0.05 % ointment Topical, 2 TIMES DAILY    diclofenac (VOLTAREN) 2 g, Topical, 4 TIMES DAILY    gabapentin (NEURONTIN) 300 mg capsule take 2 capsules by mouth at bedtime for 4 days then ADD 1 CAPSULE MIDDAY    glucose blood VI test strips (OneTouch Verio test strips) strip Check blood glucose twice daily    lancets (OneTouch Delica Plus Lancet) 33 gauge misc To check blood glucose twice daily    meloxicam (MOBIC) 15 mg, Oral, DAILY    methIMAzole (TAPAZOLE) 5 mg tablet take 1 tablet by mouth once daily --CHANGE IN DOSE TO 5 MG    propranoloL (INDERAL) 10 mg, Oral, DAILY    sertraline (ZOLOFT) 100 mg tablet One and one half tablets (150) mg orally every day (new dose)    traZODone (DESYREL) 100 mg tablet take 1-2 tablets by mouth at bedtime if needed for sleep       Allergies: Allergies   Allergen Reactions    Pcn [Penicillins] Hives and Rash       Social History:   Social History     Tobacco Use    Smoking status: Never Smoker    Smokeless tobacco: Never Used   Vaping Use    Vaping Use: Never used   Substance Use Topics    Alcohol use: Yes     Alcohol/week: 10.0 standard drinks     Types: 10 Glasses of wine per week     Comment: occasional liquor but not weekly     Drug use: Never        Family History:  Family History   Problem Relation Age of Onset    Thyroid Disease Maternal Aunt     Thyroid Disease Maternal Aunt     Hypertension Mother     Hypertension Father        Review of Systems:    Consitutional: denies fever, excessive weight gain or loss. Eyes: denies diplopia, eye pain. Integumentary: denies new concerning skin lesions. Ears, Nose, Mouth, Throat: denies except as per HPI. Endocrine: denies hot or cold intolerance, increased thirst.  Respiratory: denies cough, hemoptysis, wheezing  Gastrointestinal: denies trouble swallowing, nausea, emesis, regurgitation  Musculoskeletal: denies muscle weakness or wasting  Cardiovascular: denies chest pain, shortness of breath  Neurologic: denies seizures, numbness or tingling, syncope  Hematologic: denies easy bleeding or bruising    Physical Examination:   Vitals:    03/22/22 1449   BP: 120/70   BP 1 Location: Left upper arm   BP Patient Position: Sitting   BP Cuff Size: Large adult   Pulse: 75   Temp: 98.9 °F (37.2 °C)   TempSrc: Temporal   Resp: 16   Height: 5' 6\" (1.676 m)   Weight: 166 lb (75.3 kg)   SpO2: 100%        General: Comfortable, pleasant, appears stated age  Voice: Strong, speaking in full sentences, no stridor    Face: No masses or lesions, facial strength symmetric. Tenderness with palpation of left parotid gland. Ears: External ears unremarkable. Bilateral ear canal clear.  Tympanic membrane clear and intact, with visible landmarks. Clear middle ear space  Nose: External nose unremarkable. Dorsum midline. Anterior rhinoscopy demonstrates no lesions. Septum midline. Turbinates without hypertrophy. Oral Cavity / Oropharynx: No trismus. Mucosa pink and moist. No lesions. Tongue is midline and mobile. Palate elevates symmetrically. Uvula midline. Tonsils unremarkable. Base of tongue soft. Floor of mouth soft. Neck: Supple. Tenderness with palpation of left submandibular node. Thyroid unremarkable. Palpable laryngeal landmarks. Full neck range of motion   Neurologic: CN II - XI intact. Normal gait      Assessment and Plan:   1. Sialadenitis  2. Hx of tonsil stones    -RX Doxy x 14 days. -Warm compress, hydration, massage.  -Discussed conservative treatment measures for tonsil stones vs surgical. Tonsillectomy handout given. -Patient would like surgical consult with Marie for reoccurring/frequent tonsil stones. -Return to office in 3 weeks.        Fannie Davis MSN, FNP-C  [facility]  [address] [place] [address] 6  [facility]  Office Phone: [phone]

## 2022-03-24 ENCOUNTER — TELEPHONE (OUTPATIENT)
Dept: ENT CLINIC | Age: 47
End: 2022-03-24

## 2022-03-24 RX ORDER — ONDANSETRON 4 MG/1
4 TABLET, ORALLY DISINTEGRATING ORAL
Qty: 20 TABLET | Refills: 0 | Status: SHIPPED | OUTPATIENT
Start: 2022-03-24

## 2022-03-24 NOTE — TELEPHONE ENCOUNTER
Pt called stating that the antibiotics prescribed at her last appt are making her extremely nauseous and would like to know if an anti-nausea medication can be prescribed to help.     Please advise pt

## 2022-03-24 NOTE — TELEPHONE ENCOUNTER
Spoke to patient and she said the antibiotics are makeing her sick.  I told her to discontinue them so Bryn can prescribe her something else

## 2022-03-25 ENCOUNTER — TELEPHONE (OUTPATIENT)
Dept: ENT CLINIC | Age: 47
End: 2022-03-25

## 2022-04-12 ENCOUNTER — OFFICE VISIT (OUTPATIENT)
Dept: ENT CLINIC | Age: 47
End: 2022-04-12
Payer: COMMERCIAL

## 2022-04-12 VITALS
SYSTOLIC BLOOD PRESSURE: 112 MMHG | HEIGHT: 66 IN | WEIGHT: 164 LBS | DIASTOLIC BLOOD PRESSURE: 64 MMHG | HEART RATE: 78 BPM | BODY MASS INDEX: 26.36 KG/M2

## 2022-04-12 DIAGNOSIS — R09.89 THROAT CLEARING: ICD-10-CM

## 2022-04-12 DIAGNOSIS — H92.01 OTALGIA, RIGHT: Primary | ICD-10-CM

## 2022-04-12 DIAGNOSIS — R05.9 COUGH: ICD-10-CM

## 2022-04-12 PROCEDURE — 99213 OFFICE O/P EST LOW 20 MIN: CPT | Performed by: NURSE PRACTITIONER

## 2022-04-12 RX ORDER — FLUTICASONE PROPIONATE 50 MCG
SPRAY, SUSPENSION (ML) NASAL
Qty: 1 EACH | Refills: 3 | Status: SHIPPED | OUTPATIENT
Start: 2022-04-12

## 2022-04-12 RX ORDER — OMEPRAZOLE 20 MG/1
20 CAPSULE, DELAYED RELEASE ORAL DAILY
Qty: 30 CAPSULE | Refills: 3 | Status: SHIPPED | OUTPATIENT
Start: 2022-04-12

## 2022-04-12 NOTE — PROGRESS NOTES
Otolaryngology-Head and Neck Surgery  Follow Up Patient Visit     Patient: Fredi Marinelli  YOB: 1975  MRN: [id number]  Date of Service:  4/12/2022    Chief Complaint: Right otalgia    History of Present Illness: Fredi Marinelli is a 55y.o. year old female who was last seen 3/22/22 for left otalgia. she presents today for follow up.     3/22/22: Reports left swollen glands and otalgia for 3 weeks  Denies congestion, PND, voice changes, recent dental issues, hearing changes  +rhinorrhea, difficulty swallowing  Tried hot tea, warm compress  Also complaints of chronic tonsil stones and strep  Strep infections have occurred multiple times per year until past 2 years  Tonsil stones are daily occurrence  +foul breath  Uses peroxide and removes as a daily ritual    Today reports right otalgia, left ok  Feel glands still swollen when eating  Denies difficulty swallowing, sore throat, voice changes  +dyspepsia, cough, feeling of mucus in throat  Seen in ED 2 weeks ago and given albuterol inhaler for cough and SOB which has helped.       Past Medical History:  Past Medical History:   Diagnosis Date    Dysphagia 11/4/2020    Hx traumatic fracture     Hyperthyroidism     Insomnia 11/4/2020    Vaginitis, atrophic        Past Surgical History:   Past Surgical History:   Procedure Laterality Date    HX HYSTERECTOMY  2017    HX OTHER SURGICAL      bladder had to be fixed it was cut during hysterectomy    HX ROTATOR CUFF REPAIR Right 10/2021       Medications:   Current Outpatient Medications   Medication Instructions    clobetasoL (TEMOVATE) 0.05 % ointment Topical, 2 TIMES DAILY    diclofenac (VOLTAREN) 2 g, Topical, 4 TIMES DAILY    gabapentin (NEURONTIN) 300 mg capsule take 2 capsules by mouth at bedtime for 4 days then ADD 1 CAPSULE MIDDAY    glucose blood VI test strips (OneTouch Verio test strips) strip Check blood glucose twice daily    lancets (OneTouch Delica Plus Lancet) 33 gauge misc To check blood glucose twice daily    meloxicam (MOBIC) 15 mg, Oral, DAILY    methIMAzole (TAPAZOLE) 5 mg tablet take 1 tablet by mouth once daily --CHANGE IN DOSE TO 5 MG    ondansetron (ZOFRAN ODT) 4 mg, Oral, EVERY 8 HOURS AS NEEDED    propranoloL (INDERAL) 10 mg, Oral, DAILY    sertraline (ZOLOFT) 100 mg tablet One and one half tablets (150) mg orally every day (new dose)    traZODone (DESYREL) 100 mg tablet take 1-2 tablets by mouth at bedtime if needed for sleep       Allergies: Allergies   Allergen Reactions    Pcn [Penicillins] Hives and Rash       Social History:   Social History     Tobacco Use    Smoking status: Never Smoker    Smokeless tobacco: Never Used   Vaping Use    Vaping Use: Never used   Substance Use Topics    Alcohol use: Yes     Alcohol/week: 10.0 standard drinks     Types: 10 Glasses of wine per week     Comment: occasional liquor but not weekly     Drug use: Never       Family History:  Family History   Problem Relation Age of Onset    Thyroid Disease Maternal Aunt     Thyroid Disease Maternal Aunt     Hypertension Mother     Hypertension Father        Review of Systems:  Consitutional: denies fever, excessive weight gain or loss. Eyes: denies diplopia, eye pain. Integumentary: denies new concerning skin lesions. Ears, Nose, Mouth, Throat: denies except as per HPI.   Endocrine: denies hot or cold intolerance, increased thirst.  Respiratory: denies cough, hemoptysis, wheezing  Gastrointestinal: denies trouble swallowing, nausea, emesis, regurgitation  Musculoskeletal: denies muscle weakness or wasting  Cardiovascular: denies chest pain, shortness of breath  Neurologic: denies seizures, numbness or tingling, syncope  Hematologic: denies easy bleeding or bruising    Physical Examination:   Vitals:    04/12/22 0921   BP: 112/64   Pulse: 78   Height: 5' 6\" (1.676 m)   Weight: 164 lb (74.4 kg)         General: Comfortable, pleasant, appears stated age  Voice: Strong, speaking in full sentences, no stridor    Face: No masses or lesions, facial strength symmetric. Ears: External ears unremarkable. Bilateral ear canal clear. Tympanic membrane clear and intact, with visible landmarks. Clear middle ear space  Nose: External nose unremarkable. Dorsum midline. Anterior rhinoscopy demonstrates no lesions. Septum midline. Turbinates edematous. Oral Cavity / Oropharynx: No trismus. Mucosa pink and moist. No lesions. Tongue is midline and mobile. Palate elevates symmetrically. Uvula midline. Tonsils unremarkable. Base of tongue soft. Floor of mouth soft. Neck: Supple. No adenopathy. Thyroid unremarkable. Palpable laryngeal landmarks. Full neck range of motion. Neurologic: CN II - XI intact. Normal gait      Assessment and Plan:   1. Right otalgia   2. cough   3. Clearing throat      -Glands no longer tender/swollen on exam today.   -Discussed etiologies of ETD. -Will trial Flonase.   -Add omeprazole 20mg for suspicion of LPR.  -Discussed lifestyle modifications for GERD. -Return to office in 3 months.            i.Meter MSN, FNP-C  [facility]  [address] [place] [address] 6  [facility]  Office Phone: [phone]

## 2022-04-14 ENCOUNTER — TELEPHONE (OUTPATIENT)
Dept: FAMILY MEDICINE CLINIC | Age: 47
End: 2022-04-14

## 2022-04-14 NOTE — TELEPHONE ENCOUNTER
Pt was asking for referral for Rheumatology Dr. Jef Multani.  I didn't see anything in note to do the referral thank you

## 2022-05-23 ENCOUNTER — TELEPHONE (OUTPATIENT)
Dept: ENT CLINIC | Age: 47
End: 2022-05-23

## 2022-05-23 NOTE — TELEPHONE ENCOUNTER
Pt called and lvm in regards to rescheduling her appt with Dr. Natasha De Oliveira that she has today. I attempted to call the pt back and was not able to reach her so lvm for the pt call back to get the appt r/s.

## 2022-06-18 RX ORDER — ALBUTEROL SULFATE 90 UG/1
AEROSOL, METERED RESPIRATORY (INHALATION)
COMMUNITY
Start: 2022-04-01

## 2022-06-18 RX ORDER — BETAMETHASONE SODIUM PHOSPHATE AND BETAMETHASONE ACETATE 3; 3 MG/ML; MG/ML
12 INJECTION, SUSPENSION INTRA-ARTICULAR; INTRALESIONAL; INTRAMUSCULAR; SOFT TISSUE ONCE
COMMUNITY
Start: 2022-05-05

## 2022-07-13 ENCOUNTER — TELEPHONE (OUTPATIENT)
Dept: ENT CLINIC | Age: 47
End: 2022-07-13

## 2022-07-13 NOTE — TELEPHONE ENCOUNTER
Pt called and yajairam stating she would like to r/s her previously missed appt with us.  I attempted to call the pt back to r/s and was not able to reach her so lvm stating to call our office to r/s the appt

## 2022-08-15 ENCOUNTER — NURSE TRIAGE (OUTPATIENT)
Dept: OTHER | Facility: CLINIC | Age: 47
End: 2022-08-15

## 2022-08-15 NOTE — TELEPHONE ENCOUNTER
Received call from Kameron Mir at [facility] with [employer] Complaint. Subjective: Caller states \"I tested positive for COVID on 8/10. I am still having fatigue\"     Current Symptoms: fatigue     Onset: Aug 8th    Pain Severity: 0/10; Temperature: denies     What has been tried: NA    LMP: NA Pregnant: NA    Recommended disposition: See in Office Today or Tomorrow    Care advice provided, patient verbalizes understanding; denies any other questions or concerns; instructed to call back for any new or worsening symptoms. Patient/Caller agrees with recommended disposition; writer provided warm transfer to Nixon Tomas at [facility] for appointment scheduling    Attention Provider: Thank you for allowing me to participate in the care of your patient. The patient was connected to triage in response to information provided to the [facility]. Please do not respond through this encounter as the response is not directed to a shared pool.       Reason for Disposition   [1] COVID-19 infection suspected by caller or triager AND [2] mild symptoms (cough, fever, or others) AND [3] negative COVID-19 rapid test    Protocols used: Coronavirus (COVID-19) Diagnosed or Suspected-ADULT-

## 2022-08-17 ENCOUNTER — VIRTUAL VISIT (OUTPATIENT)
Dept: FAMILY MEDICINE CLINIC | Age: 47
End: 2022-08-17
Payer: COMMERCIAL

## 2022-08-17 DIAGNOSIS — U07.1 COVID-19 VIRUS INFECTION: ICD-10-CM

## 2022-08-17 DIAGNOSIS — M25.442 FINGER JOINT SWELLING, LEFT: Primary | ICD-10-CM

## 2022-08-17 PROCEDURE — 99213 OFFICE O/P EST LOW 20 MIN: CPT | Performed by: FAMILY MEDICINE

## 2022-08-17 NOTE — PROGRESS NOTES
Family Practice  Virtual Clinic Note      Assessment/ Plan:   Diagnoses and all orders for this visit:    1. Finger joint swelling, left  -     REFERRAL TO RHEUMATOLOGY    2. COVID-19 virus infection      Recommendations based on history, physical exam and review of pertinent labs, studies and medications:   COVID-19, mild symptoms of URI. Unable to assess vitals on virtual visit. Symptoms improved. Advised patient self quarantine for 5 days. Continue wearing a mask for another 5 days after quarantine. Left index finger swelling, positive AROLDO on labs and at [facility]. Referral to rheumatologist for evaluation as requested. Noted previous AROLDO from 2021 was negative. Negative RH factor, scleroderma Ab, Vann An, Anusha Ab, ds DNA Ab, Chromatin Ab, Centromere Ab, Sjogrens Ab, uric acid, Crp, Sed rate. We discussed the expected course, resolution and complications of the diagnosis(es) in detail. Medication risks, benefits, costs, interactions, and alternatives were discussed as indicated. I advised her to contact the office if her condition worsens, changes or fails to improve as anticipated. She expressed understanding with the diagnosis(es) and plan. Rachel Duenas is a 55 y.o. female being evaluated by a video visit encounter for concerns as above. A caregiver was present when appropriate. Due to this being a TeleHealth encounter (During TSSIV-63 public health emergency), evaluation of the following organ systems was limited: Vitals/Constitutional/EENT/Resp/CV/GI//MS/Neuro/Skin/Heme-Lymph-Imm. Pursuant to the emergency declaration under the [facility], 1135 waiver authority and the [employer] Act, this Virtual  Visit was conducted, with patient's (and/or legal guardian's) consent, to reduce the patient's risk of exposure to COVID-19 and provide necessary medical care.   Services were provided through a video synchronous discussion virtually to substitute for in-person clinic visit. Provider was in the office while conducting this encounter. Patient was at home during encounter. Other persons participating in call: None  Consent:  She and/or her healthcare decision maker is aware that this patient-initiated Telehealth encounter is a billable service, with coverage as determined by her insurance carrier. She is aware that she may receive a bill and has provided verbal consent to proceed: Yes  This virtual visit was conducted via [address] Pursuant to the emergency declaration under the [facility], 1135 waiver authority and the [employer] and [employer] Act, this Virtual  Visit was conducted to reduce the patient's risk of exposure to COVID-19 and provide continuity of care for an established patient. Services were provided through a video synchronous discussion virtually to substitute for in-person clinic visit. Due to this being a TeleHealth evaluation, many elements of the physical examination are unable to be assessed. Total Time: minutes: 11-20 minutes. Follow-up and Dispositions    Return if symptoms worsen or fail to improve. Subjective:     Chief Complaint   Patient presents with    Positive For Covid-19     Jean Claude Parmar is a 55y.o. year old female who presents for evaluation of the following:      Upper Respiratory Symptoms:  Onset: 8/10/22  Current Symptoms: fever, body aches, nasal congestion, fatigue  - symptoms are improved  Treatment: None  Denies chills, hemoptysis, chest pain, shortness of breath, vomiting, diarrhea,     Request referral to rheumatology  Patient reports swollen salivary glands. Show was seen by ENT and diagnosed with possible salivary gland infection and treated with antibiotic.  The glands remained swollen and occasionally pain  - Had rheum labs done at [facility], positive AROLDO 3/2022   Endorses joint pain and selling fingers. Review of Systems   Pertinent positives and negative per HPI. All other systems  reviewed are negative for a Comprehensive ROS (10+). Past medical history, social history, family history reviewed. Medications reconciled. Objective:     Vitals measurement not available    Physical Examination:  General: Alert, cooperative, no distress, appears stated age. Eyes: Conjunctivae clear. Pupils equally round. Extraocular muscles intact. Ears: Normal appearing external ear   Nose: Nares normal appearing  Mouth/Throat: Lips, mucosa, and tongue normal. Moist mucous membranes. No tonsillar enlargement noted. Neck: Supple, symmetrical, trachea midline, no neck mass visualized. Respiratory/Cardiovascular: Breathing comfortably, in no acute respiratory distress. MSK: -Able to get her wedding ring on and off easy, imprint from ring present on left ring finger  Skin: No significant erythematous lesions or discoloration noted on facial skin. No rashes or lesions on exposed skin. Neurologic: No facial asymmetry. Normal gaze. Cranial nerves intact. Psychiatric: Affect appropriate. Mood euthymic. Thoughts logical. Speech volume and speed normal. No hallucinations. Well kempt.        Signed,    Hung Estevez MD  8/17/2022

## 2022-12-07 ENCOUNTER — TELEPHONE (OUTPATIENT)
Dept: ENDOCRINOLOGY | Age: 47
End: 2022-12-07

## 2022-12-07 DIAGNOSIS — E05.00 GRAVES DISEASE: ICD-10-CM

## 2022-12-07 DIAGNOSIS — E05.90 HYPERTHYROIDISM: ICD-10-CM

## 2022-12-07 RX ORDER — METHIMAZOLE 5 MG/1
TABLET ORAL
Qty: 90 TABLET | Refills: 1 | Status: SHIPPED | OUTPATIENT
Start: 2022-12-07

## 2022-12-07 NOTE — TELEPHONE ENCOUNTER
Patient calling to RS missed appt and she has questions about her Methimazole. Would like a call back.

## 2022-12-12 ENCOUNTER — OFFICE VISIT (OUTPATIENT)
Dept: ENT CLINIC | Age: 47
End: 2022-12-12
Payer: COMMERCIAL

## 2022-12-12 VITALS
HEART RATE: 76 BPM | SYSTOLIC BLOOD PRESSURE: 132 MMHG | WEIGHT: 155 LBS | RESPIRATION RATE: 16 BRPM | BODY MASS INDEX: 24.91 KG/M2 | OXYGEN SATURATION: 99 % | DIASTOLIC BLOOD PRESSURE: 82 MMHG | HEIGHT: 66 IN

## 2022-12-12 DIAGNOSIS — J35.8 TONSIL STONE: ICD-10-CM

## 2022-12-12 DIAGNOSIS — K11.20 SIALADENITIS: ICD-10-CM

## 2022-12-12 DIAGNOSIS — J03.91 RECURRENT TONSILLITIS: ICD-10-CM

## 2022-12-12 DIAGNOSIS — R13.14 PHARYNGOESOPHAGEAL DYSPHAGIA: Primary | ICD-10-CM

## 2022-12-12 PROCEDURE — 99214 OFFICE O/P EST MOD 30 MIN: CPT | Performed by: OTOLARYNGOLOGY

## 2022-12-12 RX ORDER — OMEPRAZOLE 40 MG/1
40 CAPSULE, DELAYED RELEASE ORAL DAILY
Qty: 90 CAPSULE | Refills: 1 | Status: SHIPPED | OUTPATIENT
Start: 2022-12-12

## 2022-12-12 NOTE — PROGRESS NOTES
Otolaryngology-Head and Neck Surgery  Follow Up Patient Visit     Patient: Nathanael Rinaldi  YOB: 1975  MRN: [id number]  Date of Service:  12/12/2022    Chief Complaint:  Follow up throat    History of Present Illness: Nathanael Rinaldi is a 52y.o. year old female who was last seen by our NP several months ago for a few issues    1) hx of recurrent left neck swelling. Was treated with doxycycline at one point which seemed to help. Otherwise seems to go away with conservative management. Has happened 3 times in last year or so  No issues for the last few months  Recent dx of Lupus and possible Sjogrens, working with Dr Sharol Lombard.  Has just started plaquenil     2) Hx of recurrent tonsil stones and tonsillitis  Has had prior PTA - many years ago (as teen)  Has frequent strep, no recent strep since 2020  Deals with chronic sore throats and frequent tonsil stone    3) Dysphagia and possible reflux  Lately has been noticing difficulty swallowing - can be solids or liquids  Wonders about refux  Was previously given PPI 20 mg, but ran out  Seemed to help before     Past Medical History:  Past Medical History:   Diagnosis Date    Dysphagia 11/4/2020    Hx traumatic fracture     Hyperthyroidism     Insomnia 11/4/2020    Vaginitis, atrophic        Past Surgical History:   Past Surgical History:   Procedure Laterality Date    HX HYSTERECTOMY  2017    HX OTHER SURGICAL      bladder had to be fixed it was cut during hysterectomy    HX ROTATOR CUFF REPAIR Right 10/2021       Medications:   Current Outpatient Medications   Medication Instructions    albuterol (PROVENTIL HFA, VENTOLIN HFA, PROAIR HFA) 90 mcg/actuation inhaler inhale 2 puffs by mouth and INTO THE LUNGS every 6 hours if needed for shortness of breath    betamethasone (CELESTONE) 6 mg/mL injection 12 mg, Intra artICUlar, ONCE    clobetasoL (TEMOVATE) 0.05 % ointment Topical, 2 TIMES DAILY    diclofenac (VOLTAREN) 2 g, Topical, 4 TIMES DAILY    fluticasone propionate (FLONASE) 50 mcg/actuation nasal spray 2 sprays in each nostril daily. gabapentin (NEURONTIN) 300 mg capsule take 2 capsules by mouth at bedtime for 4 days then ADD 1 CAPSULE MIDDAY    glucose blood VI test strips (OneTouch Verio test strips) strip Check blood glucose twice daily    lancets (OneTouch Delica Plus Lancet) 33 gauge misc To check blood glucose twice daily    meloxicam (MOBIC) 15 mg, Oral, DAILY    methIMAzole (TAPAZOLE) 5 mg tablet take 1 tablet by mouth once daily --CHANGE IN DOSE TO 5 MG    omeprazole (PRILOSEC) 20 mg, Oral, DAILY    ondansetron (ZOFRAN ODT) 4 mg, Oral, EVERY 8 HOURS AS NEEDED    propranoloL (INDERAL) 10 mg, Oral, DAILY    sertraline (ZOLOFT) 100 mg tablet One and one half tablets (150) mg orally every day (new dose)    traZODone (DESYREL) 100 mg tablet take 1-2 tablets by mouth at bedtime if needed for sleep       Allergies: Allergies   Allergen Reactions    Pcn [Penicillins] Hives and Rash    Penicillamine Unknown (comments)       Social History:   Social History     Tobacco Use    Smoking status: Never    Smokeless tobacco: Never   Vaping Use    Vaping Use: Never used   Substance Use Topics    Alcohol use: Yes     Alcohol/week: 10.0 standard drinks     Types: 10 Glasses of wine per week     Comment: occasional liquor but not weekly     Drug use: Never       Family History:  Family History   Problem Relation Age of Onset    Thyroid Disease Maternal Aunt     Thyroid Disease Maternal Aunt     Hypertension Mother     Hypertension Father        Review of Systems:  Consitutional: denies fever, excessive weight gain or loss. Eyes: denies diplopia, eye pain. Integumentary: denies new concerning skin lesions. Ears, Nose, Mouth, Throat: denies except as per HPI.   Endocrine: denies hot or cold intolerance, increased thirst.  Respiratory: denies cough, hemoptysis, wheezing  Gastrointestinal: denies trouble swallowing, nausea, emesis, regurgitation  Musculoskeletal: denies muscle weakness or wasting  Cardiovascular: denies chest pain, shortness of breath  Neurologic: denies seizures, numbness or tingling, syncope  Hematologic: denies easy bleeding or bruising    Physical Examination:   Vitals:    12/12/22 0922   BP: 132/82   BP 1 Location: Left upper arm   BP Patient Position: Sitting   BP Cuff Size: Adult   Pulse: 76   Resp: 16   Height: 5' 6\" (1.676 m)   Weight: 155 lb (70.3 kg)   SpO2: 99%         General: Comfortable, pleasant, appears stated age  Voice: Strong, speaking in full sentences, no stridor    Face: No masses or lesions, facial strength symmetric   Ears: External ears unremarkable. Bilateral ear canal clear. Tympanic membrane clear and intact, with visible landmarks. Clear middle ear space  Nose: External nose unremarkable. Dorsum midline. Anterior rhinoscopy demonstrates no lesions. Septum midline. Turbinates without hypertrophy. Oral Cavity / Oropharynx: No trismus. Mucosa pink and moist. No lesions. Tongue is midline and mobile. Palate elevates symmetrically. Uvula midline. Tonsils unremarkable. Base of tongue soft. Floor of mouth soft. Neck: Supple. No adenopathy. Thyroid unremarkable. Palpable laryngeal landmarks. Full neck range of motion   Neurologic: CN II - XI intact. Normal gait      Assessment and Plan:   Recurrent left neck swelling   Autoimmune disorder  - Recently dx autoimmune disorder  - Today without active neck swelling but based on description of symptoms - does sound like recurrent sialoadenitis  - Can let us know if active infection so I can see with active symptoms  - Discussed Sjogrens may contribute to frequency of salivary infection  - Discussed conservative management including hydration, sialogogue and massage  - Consider CT if refractory to conservative Rx    Recurrent tonsillitis  Tonsil stones  - Chronic  - Tonsil infections better in last couple of years  - If tonsil infections start again can consider tonsillectomy     5.  Dysphagia   - Also discussed autoimmune disorders can impact swallowing  - Offered flex scope today however will start with PPI (increase to 40 mg) and check barium swallow  - RTC to review results  - Plan scope if not better          The patient was instructed to return to clinic if no improvement or progression of symptoms. Signs to watch out for reviewed.       MD Ronit [address]  [address] [place] [address]  Office Phone: [phone]

## 2023-01-19 ENCOUNTER — HOSPITAL ENCOUNTER (OUTPATIENT)
Dept: GENERAL RADIOLOGY | Age: 48
Discharge: HOME | End: 2023-01-19
Attending: OTOLARYNGOLOGY
Payer: COMMERCIAL

## 2023-01-19 DIAGNOSIS — R13.14 PHARYNGOESOPHAGEAL DYSPHAGIA: ICD-10-CM

## 2023-01-19 PROCEDURE — 74220 X-RAY XM ESOPHAGUS 1CNTRST: CPT

## 2023-01-19 PROCEDURE — 74011000250 HC RX REV CODE- 250: Performed by: OTOLARYNGOLOGY

## 2023-01-19 RX ADMIN — BARIUM SULFATE 75 ML: 980 POWDER, FOR SUSPENSION ORAL at 12:15

## 2023-01-19 RX ADMIN — BARIUM SULFATE 700 MG: 700 TABLET ORAL at 12:15

## 2023-01-19 RX ADMIN — BARIUM SULFATE 200 ML: 0.6 SUSPENSION ORAL at 12:15

## 2023-01-19 RX ADMIN — ANTACID/ANTIFLATULENT 4 G: 380; 550; 10; 10 GRANULE, EFFERVESCENT ORAL at 12:15

## 2023-01-23 ENCOUNTER — OFFICE VISIT (OUTPATIENT)
Dept: ENT CLINIC | Age: 48
End: 2023-01-23
Payer: COMMERCIAL

## 2023-01-23 VITALS
HEART RATE: 78 BPM | DIASTOLIC BLOOD PRESSURE: 88 MMHG | HEIGHT: 66 IN | RESPIRATION RATE: 17 BRPM | BODY MASS INDEX: 24.91 KG/M2 | WEIGHT: 155 LBS | SYSTOLIC BLOOD PRESSURE: 134 MMHG | OXYGEN SATURATION: 98 %

## 2023-01-23 DIAGNOSIS — R13.14 PHARYNGOESOPHAGEAL DYSPHAGIA: Primary | ICD-10-CM

## 2023-01-23 DIAGNOSIS — J03.91 RECURRENT TONSILLITIS: ICD-10-CM

## 2023-01-23 DIAGNOSIS — K11.20 SIALADENITIS: ICD-10-CM

## 2023-01-23 PROCEDURE — 99213 OFFICE O/P EST LOW 20 MIN: CPT | Performed by: OTOLARYNGOLOGY

## 2023-01-23 NOTE — PROGRESS NOTES
Otolaryngology-Head and Neck Surgery  Follow Up Patient Visit     Patient: Helio Vásquez  YOB: 1975  MRN: [id number]  Date of Service:  1/23/2023    Chief Complaint:  Follow up throat    Interval hx 1/23/2023  Had barium swallow  Mild improvement since increasing PPI     History of Present Illness: Helio Vásquez is a 52y.o. year old female who was last seen by our NP several months ago for a few issues    1) hx of recurrent left neck swelling. Was treated with doxycycline at one point which seemed to help. Otherwise seems to go away with conservative management. Has happened 3 times in last year or so  No issues for the last few months  Recent dx of Lupus and possible Sjogrens, working with Dr Don Clarke.  Has just started plaquenil     2) Hx of recurrent tonsil stones and tonsillitis  Has had prior PTA - many years ago (as teen)  Has frequent strep, no recent strep since 2020  Deals with chronic sore throats and frequent tonsil stone    3) Dysphagia and possible reflux  Lately has been noticing difficulty swallowing - can be solids or liquids  Wonders about refux  Was previously given PPI 20 mg, but ran out  Seemed to help before     Past Medical History:  Past Medical History:   Diagnosis Date    Dysphagia 11/4/2020    Hx traumatic fracture     Hyperthyroidism     Insomnia 11/4/2020    Vaginitis, atrophic        Past Surgical History:   Past Surgical History:   Procedure Laterality Date    HX HYSTERECTOMY  2017    HX OTHER SURGICAL      bladder had to be fixed it was cut during hysterectomy    HX ROTATOR CUFF REPAIR Right 10/2021       Medications:   Current Outpatient Medications   Medication Instructions    albuterol (PROVENTIL HFA, VENTOLIN HFA, PROAIR HFA) 90 mcg/actuation inhaler inhale 2 puffs by mouth and INTO THE LUNGS every 6 hours if needed for shortness of breath    betamethasone (CELESTONE) 6 mg/mL injection 12 mg, Intra artICUlar, ONCE    clobetasoL (TEMOVATE) 0.05 % ointment Topical, 2 TIMES DAILY    diclofenac (VOLTAREN) 2 g, Topical, 4 TIMES DAILY    fluticasone propionate (FLONASE) 50 mcg/actuation nasal spray 2 sprays in each nostril daily. gabapentin (NEURONTIN) 300 mg capsule take 2 capsules by mouth at bedtime for 4 days then ADD 1 CAPSULE MIDDAY    glucose blood VI test strips (OneTouch Verio test strips) strip Check blood glucose twice daily    lancets (OneTouch Delica Plus Lancet) 33 gauge misc To check blood glucose twice daily    meloxicam (MOBIC) 15 mg, Oral, DAILY    methIMAzole (TAPAZOLE) 5 mg tablet take 1 tablet by mouth once daily --CHANGE IN DOSE TO 5 MG    omeprazole (PRILOSEC) 40 mg, Oral, DAILY    ondansetron (ZOFRAN ODT) 4 mg, Oral, EVERY 8 HOURS AS NEEDED    propranoloL (INDERAL) 10 mg, Oral, DAILY    sertraline (ZOLOFT) 100 mg tablet One and one half tablets (150) mg orally every day (new dose)    traZODone (DESYREL) 100 mg tablet take 1-2 tablets by mouth at bedtime if needed for sleep       Allergies: Allergies   Allergen Reactions    Pcn [Penicillins] Hives and Rash    Penicillamine Unknown (comments)       Social History:   Social History     Tobacco Use    Smoking status: Never    Smokeless tobacco: Never   Vaping Use    Vaping Use: Never used   Substance Use Topics    Alcohol use: Yes     Alcohol/week: 10.0 standard drinks     Types: 10 Glasses of wine per week     Comment: occasional liquor but not weekly     Drug use: Never       Family History:  Family History   Problem Relation Age of Onset    Thyroid Disease Maternal Aunt     Thyroid Disease Maternal Aunt     Hypertension Mother     Hypertension Father        Review of Systems:  Consitutional: denies fever, excessive weight gain or loss. Eyes: denies diplopia, eye pain. Integumentary: denies new concerning skin lesions. Ears, Nose, Mouth, Throat: denies except as per HPI.   Endocrine: denies hot or cold intolerance, increased thirst.  Respiratory: denies cough, hemoptysis, wheezing  Gastrointestinal: denies trouble swallowing, nausea, emesis, regurgitation  Musculoskeletal: denies muscle weakness or wasting  Cardiovascular: denies chest pain, shortness of breath  Neurologic: denies seizures, numbness or tingling, syncope  Hematologic: denies easy bleeding or bruising    Physical Examination:   Vitals:    01/23/23 0942   BP: 134/88   BP 1 Location: Left upper arm   BP Patient Position: Sitting   BP Cuff Size: Adult   Pulse: 78   Resp: 17   Height: 5' 6\" (1.676 m)   Weight: 155 lb (70.3 kg)   SpO2: 98%         General: Comfortable, pleasant, appears stated age  Voice: Strong, speaking in full sentences, no stridor    Face: No masses or lesions, facial strength symmetric   Ears: External ears unremarkable. Bilateral ear canal clear. Tympanic membrane clear and intact, with visible landmarks. Clear middle ear space  Nose: External nose unremarkable. Dorsum midline. Anterior rhinoscopy demonstrates no lesions. Septum midline. Turbinates without hypertrophy. Oral Cavity / Oropharynx: No trismus. Mucosa pink and moist. No lesions. Tongue is midline and mobile. Palate elevates symmetrically. Uvula midline. Tonsils unremarkable. Base of tongue soft. Floor of mouth soft. Neck: Supple. No adenopathy. Thyroid unremarkable. Palpable laryngeal landmarks. Full neck range of motion   Neurologic: CN II - XI intact. Normal gait      Barium swallow  IMPRESSION  Gastroesophageal reflux. No hiatal hernia.       Assessment and Plan:   Recurrent left neck swelling   Autoimmune disorder  - Recently dx autoimmune disorder  - Today without active neck swelling but based on description of symptoms - does sound like recurrent sialoadenitis  - Can let us know if active infection so I can see with active symptoms  - Discussed Sjogrens may contribute to frequency of salivary infection  - Discussed conservative management including hydration, sialogogue and massage  - Consider CT if refractory to conservative Rx; will continue with above plan    Recurrent tonsillitis  Tonsil stones  - Chronic  - Tonsil infections better in last couple of years  - If tonsil infections start again can consider tonsillectomy     5. Dysphagia   - Also discussed autoimmune disorders can impact swallowing  - Reviewed barium swallow results suggestive of GERD  - Will continue PPI 40 mg for another 1 month  - She will update me with progress; if not continuing to improve, will plan flex scope and GI referral for possible endoscopy   - Offered follow up, she prefers to update me via [employer]      The patient was instructed to return to clinic if no improvement or progression of symptoms. Signs to watch out for reviewed.       MD Ronit [facility]  [address] [place] [address] [address]  [facility]  Office Phone: [phone]

## 2023-01-23 NOTE — PROGRESS NOTES
Chief Complaint   Patient presents with    Follow-up        Visit Vitals  /88 (BP 1 Location: Left upper arm, BP Patient Position: Sitting, BP Cuff Size: Adult)   Pulse 78   Resp 17   Ht 5' 6\" (1.676 m)   Wt 155 lb (70.3 kg)   SpO2 98%   BMI 25.02 kg/m²

## 2023-01-26 ENCOUNTER — TRANSCRIBE ORDER (OUTPATIENT)
Dept: SCHEDULING | Age: 48
End: 2023-01-26

## 2023-01-26 DIAGNOSIS — M54.12 CERVICAL RADICULOPATHY: ICD-10-CM

## 2023-01-26 DIAGNOSIS — M54.2 NECK PAIN: Primary | ICD-10-CM

## 2023-02-02 ENCOUNTER — HOSPITAL ENCOUNTER (OUTPATIENT)
Dept: MRI IMAGING | Age: 48
Discharge: HOME | End: 2023-02-02
Attending: ORTHOPAEDIC SURGERY
Payer: COMMERCIAL

## 2023-02-02 DIAGNOSIS — M54.12 CERVICAL RADICULOPATHY: ICD-10-CM

## 2023-02-02 DIAGNOSIS — M54.2 NECK PAIN: ICD-10-CM

## 2023-02-02 PROCEDURE — 72141 MRI NECK SPINE W/O DYE: CPT

## 2023-03-23 ENCOUNTER — TRANSCRIBE ORDER (OUTPATIENT)
Dept: SCHEDULING | Age: 48
End: 2023-03-23

## 2023-03-23 DIAGNOSIS — R41.3 OTHER AMNESIA: Primary | ICD-10-CM

## 2023-04-07 ENCOUNTER — HOSPITAL ENCOUNTER (OUTPATIENT)
Dept: MRI IMAGING | Age: 48
End: 2023-04-07
Attending: PSYCHIATRY & NEUROLOGY
Payer: COMMERCIAL

## 2023-04-23 DIAGNOSIS — R41.3 OTHER AMNESIA: Primary | ICD-10-CM

## 2023-05-23 ENCOUNTER — NURSE ONLY (OUTPATIENT)
Age: 48
End: 2023-05-23

## 2023-05-23 DIAGNOSIS — E05.00 THYROTOXICOSIS WITH DIFFUSE GOITER WITHOUT THYROTOXIC CRISIS OR STORM: ICD-10-CM

## 2023-05-23 DIAGNOSIS — R41.3 OTHER AMNESIA: ICD-10-CM

## 2023-05-23 DIAGNOSIS — E16.2 HYPOGLYCEMIA, UNSPECIFIED: ICD-10-CM

## 2023-05-23 DIAGNOSIS — E55.9 VITAMIN D DEFICIENCY, UNSPECIFIED: ICD-10-CM

## 2023-05-23 DIAGNOSIS — E05.80 OTHER THYROTOXICOSIS WITHOUT THYROTOXIC CRISIS OR STORM: Primary | ICD-10-CM

## 2023-05-23 NOTE — PROGRESS NOTES
Order placed for pt per verbal order with read back from [facility].

## 2023-05-24 LAB
T3 SERPL-MCNC: 87 NG/DL (ref 71–180)
T4 FREE SERPL-MCNC: 1.45 NG/DL (ref 0.82–1.77)
TSH SERPL DL<=0.005 MIU/L-ACNC: 0.91 UIU/ML (ref 0.45–4.5)

## 2023-05-24 RX ORDER — OXYCODONE HYDROCHLORIDE 5 MG/1
TABLET ORAL
COMMUNITY
Start: 2023-05-04

## 2023-05-24 RX ORDER — METHIMAZOLE 5 MG/1
TABLET ORAL
COMMUNITY
Start: 2023-05-16

## 2023-05-24 RX ORDER — OMEPRAZOLE 40 MG/1
CAPSULE, DELAYED RELEASE ORAL
COMMUNITY
Start: 2023-05-16

## 2023-05-24 RX ORDER — CLOBETASOL PROPIONATE 0.5 MG/G
CREAM TOPICAL
COMMUNITY
Start: 2023-04-12

## 2023-05-24 RX ORDER — ACETAMINOPHEN/DIPHENHYDRAMINE 500MG-25MG
TABLET ORAL
COMMUNITY
Start: 2023-05-04

## 2023-05-24 RX ORDER — ONDANSETRON 4 MG/1
TABLET, FILM COATED ORAL
COMMUNITY
Start: 2023-05-04

## 2023-05-24 RX ORDER — PROPRANOLOL HYDROCHLORIDE 20 MG/1
20 TABLET ORAL DAILY
COMMUNITY
Start: 2023-03-30

## 2023-05-24 RX ORDER — GABAPENTIN 100 MG/1
100 CAPSULE ORAL 3 TIMES DAILY
COMMUNITY
Start: 2023-03-30

## 2023-05-25 LAB — 25(OH)D3+25(OH)D2 SERPL-MCNC: 19.5 NG/ML (ref 30–100)

## 2023-05-26 RX ORDER — TRAZODONE HYDROCHLORIDE 100 MG/1
TABLET ORAL
COMMUNITY
Start: 2022-01-06

## 2023-05-26 RX ORDER — FLUTICASONE PROPIONATE 50 MCG
SPRAY, SUSPENSION (ML) NASAL
COMMUNITY
Start: 2022-04-12

## 2023-05-26 RX ORDER — SERTRALINE HYDROCHLORIDE 100 MG/1
TABLET, FILM COATED ORAL
COMMUNITY
Start: 2021-05-06

## 2023-05-26 RX ORDER — BETAMETHASONE SODIUM PHOSPHATE AND BETAMETHASONE ACETATE 3; 3 MG/ML; MG/ML
12 INJECTION, SUSPENSION INTRA-ARTICULAR; INTRALESIONAL; INTRAMUSCULAR; SOFT TISSUE ONCE
COMMUNITY
Start: 2022-05-05

## 2023-05-26 RX ORDER — MELOXICAM 15 MG/1
15 TABLET ORAL DAILY
COMMUNITY
Start: 2021-03-05

## 2023-05-26 RX ORDER — ALBUTEROL SULFATE 90 UG/1
AEROSOL, METERED RESPIRATORY (INHALATION)
COMMUNITY
Start: 2022-04-01

## 2024-05-10 ENCOUNTER — OFFICE VISIT (OUTPATIENT)
Age: 49
End: 2024-05-10
Payer: COMMERCIAL

## 2024-05-10 VITALS
RESPIRATION RATE: 18 BRPM | OXYGEN SATURATION: 93 % | BODY MASS INDEX: 25.07 KG/M2 | TEMPERATURE: 97.3 F | HEART RATE: 69 BPM | WEIGHT: 156 LBS | DIASTOLIC BLOOD PRESSURE: 90 MMHG | SYSTOLIC BLOOD PRESSURE: 132 MMHG | HEIGHT: 66 IN

## 2024-05-10 DIAGNOSIS — E05.00 GRAVES DISEASE: ICD-10-CM

## 2024-05-10 DIAGNOSIS — E05.00 GRAVES DISEASE: Primary | ICD-10-CM

## 2024-05-10 DIAGNOSIS — E05.80 OTHER THYROTOXICOSIS WITHOUT THYROTOXIC CRISIS OR STORM: ICD-10-CM

## 2024-05-10 DIAGNOSIS — E04.9 GOITER: ICD-10-CM

## 2024-05-10 PROCEDURE — 99214 OFFICE O/P EST MOD 30 MIN: CPT | Performed by: STUDENT IN AN ORGANIZED HEALTH CARE EDUCATION/TRAINING PROGRAM

## 2024-05-10 RX ORDER — HYDROXYCHLOROQUINE SULFATE 200 MG/1
200 TABLET, FILM COATED ORAL DAILY
COMMUNITY
Start: 2022-11-21

## 2024-05-10 RX ORDER — PREGABALIN 50 MG/1
50 CAPSULE ORAL 3 TIMES DAILY
COMMUNITY
Start: 2024-04-19

## 2024-05-10 RX ORDER — NAPROXEN 500 MG/1
500 TABLET ORAL PRN
COMMUNITY
Start: 2023-10-25

## 2024-05-10 NOTE — PROGRESS NOTES
Chief Complaint   Patient presents with    New Patient    Thyroid Problem     BP (!) 146/104 (Site: Left Upper Arm, Position: Sitting, Cuff Size: Medium Adult)   Pulse 69   Temp 97.3 °F (36.3 °C) (Temporal)   Resp 18   Ht 1.676 m (5' 6\")   Wt 70.8 kg (156 lb)   SpO2 93%   BMI 25.18 kg/m²     BP (!) 132/90 (Site: Left Upper Arm, Position: Sitting, Cuff Size: Medium Adult)   Pulse 69   Temp 97.3 °F (36.3 °C) (Temporal)   Resp 18   Ht 1.676 m (5' 6\")   Wt 70.8 kg (156 lb)   SpO2 93%   BMI 25.18 kg/m²

## 2024-05-10 NOTE — PROGRESS NOTES
[facility]                                                                                    Blank Luis M.D          Patient Information  Date:5/10/2024  Name : Gretta Gao 48 y.o.     YOB: 1975         Referred by: Jagdeep Bang, APRN - CNP       Chief Complaint   Patient presents with    New Patient    Thyroid Problem       History of Present Illness: Gretta Gao is a 48 y.o. female with Graves disease who presented to the clinic. Previously see by Dr. Hillman, last seen in 2022.          She was diagnosed with Graves approximately in 2011/2012. Had been on PTU then Methimazole. Has been off methimazole intermittently due to insurance issues.   Last labs in 2023.   Off methimazole since  Last July 2023   Has noticed palpitations and jitteriness.  No change in weight  Has diarrhea.     Reports eye irritation and dryness, feels like right eye is bulging.     Does not smoke   Has not seen ophthalmologist.     Past Medical History:   Diagnosis Date    Dysphagia 11/4/2020    Hx traumatic fracture     Hyperthyroidism     Insomnia 11/4/2020    Vaginitis, atrophic        Past Surgical History:   Procedure Laterality Date    HYSTERECTOMY (CERVIX STATUS UNKNOWN)  2017    OTHER SURGICAL HISTORY      bladder had to be fixed it was cut during hysterectomy    ROTATOR CUFF REPAIR Right 10/2021        Social History     Socioeconomic History    Marital status:      Spouse name: Not on file    Number of children: Not on file    Years of education: Not on file    Highest education level: Not on file   Occupational History    Not on file   Tobacco Use    Smoking status: Never    Smokeless tobacco: Never   Substance and Sexual Activity    Alcohol use: Yes     Alcohol/week: 10.0 standard drinks of alcohol    Drug use: Never    Sexual activity: Not on file     Comment: hyst   Other Topics Concern    Not on file   Social History Narrative    Not on file

## 2024-05-11 LAB
ALBUMIN SERPL-MCNC: 4.2 G/DL (ref 3.9–4.9)
ALBUMIN/GLOB SERPL: 1.4 {RATIO} (ref 1.2–2.2)
ALP SERPL-CCNC: 81 IU/L (ref 44–121)
ALT SERPL-CCNC: 13 IU/L (ref 0–32)
AST SERPL-CCNC: 18 IU/L (ref 0–40)
BILIRUB SERPL-MCNC: 0.3 MG/DL (ref 0–1.2)
BUN SERPL-MCNC: 8 MG/DL (ref 6–24)
BUN/CREAT SERPL: 11 (ref 9–23)
CALCIUM SERPL-MCNC: 9.4 MG/DL (ref 8.7–10.2)
CHLORIDE SERPL-SCNC: 101 MMOL/L (ref 96–106)
CO2 SERPL-SCNC: 22 MMOL/L (ref 20–29)
CREAT SERPL-MCNC: 0.73 MG/DL (ref 0.57–1)
EGFRCR SERPLBLD CKD-EPI 2021: 101 ML/MIN/1.73
GLOBULIN SER CALC-MCNC: 3 G/DL (ref 1.5–4.5)
GLUCOSE SERPL-MCNC: 103 MG/DL (ref 70–99)
POTASSIUM SERPL-SCNC: 4.4 MMOL/L (ref 3.5–5.2)
PROT SERPL-MCNC: 7.2 G/DL (ref 6–8.5)
SODIUM SERPL-SCNC: 139 MMOL/L (ref 134–144)
T3 SERPL-MCNC: 97 NG/DL (ref 71–180)
T4 FREE SERPL-MCNC: 1.49 NG/DL (ref 0.82–1.77)
TSH SERPL DL<=0.005 MIU/L-ACNC: 0.2 UIU/ML (ref 0.45–4.5)

## 2024-05-13 ENCOUNTER — TELEPHONE (OUTPATIENT)
Age: 49
End: 2024-05-13

## 2024-05-13 RX ORDER — METHIMAZOLE 5 MG/1
5 TABLET ORAL
Qty: 12 TABLET | Refills: 1 | Status: SHIPPED | OUTPATIENT
Start: 2024-05-13 | End: 2024-07-12

## 2024-05-13 NOTE — TELEPHONE ENCOUNTER
[phone]    Fax number [facility] [place] [phone]    Asking for a clearance from dr moreno so patient can have surgery on 5-17-24    Patient stated they need to be placed back on her methenmazole before surgery     Patient would like a call back at [phone]

## 2024-05-13 NOTE — TELEPHONE ENCOUNTER
Discussed labs with patient, will start methimazole 5 mg every Monday Wednesday Friday.   No further work up or measures needed prior to undergoing surgery for her shoulder on 5/17/24. Ok from endocrine standpoint

## 2024-05-16 ENCOUNTER — HOSPITAL ENCOUNTER (OUTPATIENT)
Facility: HOSPITAL | Age: 49
Discharge: HOME | End: 2024-05-16
Attending: STUDENT IN AN ORGANIZED HEALTH CARE EDUCATION/TRAINING PROGRAM
Payer: COMMERCIAL

## 2024-05-16 DIAGNOSIS — E04.9 GOITER: ICD-10-CM

## 2024-05-16 PROCEDURE — 76536 US EXAM OF HEAD AND NECK: CPT

## 2024-05-28 ENCOUNTER — TELEPHONE (OUTPATIENT)
Age: 49
End: 2024-05-28

## 2024-09-19 ENCOUNTER — OFFICE VISIT (OUTPATIENT)
Age: 49
End: 2024-09-19
Payer: COMMERCIAL

## 2024-09-19 VITALS
HEART RATE: 70 BPM | RESPIRATION RATE: 16 BRPM | DIASTOLIC BLOOD PRESSURE: 91 MMHG | WEIGHT: 150.4 LBS | SYSTOLIC BLOOD PRESSURE: 140 MMHG | HEIGHT: 66 IN | OXYGEN SATURATION: 99 % | TEMPERATURE: 98.7 F | BODY MASS INDEX: 24.17 KG/M2

## 2024-09-19 DIAGNOSIS — E05.00 GRAVES DISEASE: Primary | ICD-10-CM

## 2024-09-19 DIAGNOSIS — E05.00 GRAVES DISEASE: ICD-10-CM

## 2024-09-19 DIAGNOSIS — E04.9 GOITER: ICD-10-CM

## 2024-09-19 PROCEDURE — 99213 OFFICE O/P EST LOW 20 MIN: CPT | Performed by: STUDENT IN AN ORGANIZED HEALTH CARE EDUCATION/TRAINING PROGRAM

## 2024-09-26 LAB
T4 FREE SERPL-MCNC: 1.37 NG/DL (ref 0.82–1.77)
TSH SERPL DL<=0.005 MIU/L-ACNC: 0.35 UIU/ML (ref 0.45–4.5)

## 2024-10-15 RX ORDER — METHIMAZOLE 5 MG/1
TABLET ORAL
Qty: 12 TABLET | Refills: 2 | Status: SHIPPED | OUTPATIENT
Start: 2024-10-15

## 2024-11-15 ENCOUNTER — TELEPHONE (OUTPATIENT)
Age: 49
End: 2024-11-15

## 2024-11-21 ENCOUNTER — OFFICE VISIT (OUTPATIENT)
Facility: CLINIC | Age: 49
End: 2024-11-21

## 2024-11-21 VITALS
WEIGHT: 151.2 LBS | SYSTOLIC BLOOD PRESSURE: 130 MMHG | RESPIRATION RATE: 16 BRPM | HEART RATE: 74 BPM | BODY MASS INDEX: 24.3 KG/M2 | HEIGHT: 66 IN | OXYGEN SATURATION: 99 % | DIASTOLIC BLOOD PRESSURE: 92 MMHG

## 2024-11-21 DIAGNOSIS — Z00.01 ENCOUNTER FOR GENERAL ADULT MEDICAL EXAMINATION WITH ABNORMAL FINDINGS: ICD-10-CM

## 2024-11-21 DIAGNOSIS — M32.9 SYSTEMIC LUPUS ERYTHEMATOSUS, UNSPECIFIED SLE TYPE, UNSPECIFIED ORGAN INVOLVEMENT STATUS (HCC): ICD-10-CM

## 2024-11-21 DIAGNOSIS — R59.0 LYMPHADENOPATHY, AXILLARY: Primary | ICD-10-CM

## 2024-11-21 DIAGNOSIS — E05.00 GRAVES DISEASE: ICD-10-CM

## 2024-11-21 DIAGNOSIS — E05.90 HYPERTHYROIDISM: ICD-10-CM

## 2024-11-21 DIAGNOSIS — I10 ESSENTIAL HYPERTENSION: ICD-10-CM

## 2024-11-21 RX ORDER — LOSARTAN POTASSIUM 25 MG/1
25 TABLET ORAL DAILY
Qty: 30 TABLET | Refills: 5 | Status: SHIPPED | OUTPATIENT
Start: 2024-11-21

## 2024-11-21 RX ORDER — KETOROLAC TROMETHAMINE 10 MG/1
10 TABLET, FILM COATED ORAL EVERY 6 HOURS PRN
Qty: 20 TABLET | Refills: 5 | Status: SHIPPED | OUTPATIENT
Start: 2024-11-21 | End: 2024-12-21

## 2024-11-21 SDOH — ECONOMIC STABILITY: INCOME INSECURITY: HOW HARD IS IT FOR YOU TO PAY FOR THE VERY BASICS LIKE FOOD, HOUSING, MEDICAL CARE, AND HEATING?: NOT VERY HARD

## 2024-11-21 SDOH — ECONOMIC STABILITY: FOOD INSECURITY: WITHIN THE PAST 12 MONTHS, THE FOOD YOU BOUGHT JUST DIDN'T LAST AND YOU DIDN'T HAVE MONEY TO GET MORE.: NEVER TRUE

## 2024-11-21 SDOH — ECONOMIC STABILITY: FOOD INSECURITY: WITHIN THE PAST 12 MONTHS, YOU WORRIED THAT YOUR FOOD WOULD RUN OUT BEFORE YOU GOT MONEY TO BUY MORE.: NEVER TRUE

## 2024-11-21 ASSESSMENT — PATIENT HEALTH QUESTIONNAIRE - PHQ9
3. TROUBLE FALLING OR STAYING ASLEEP: NOT AT ALL
10. IF YOU CHECKED OFF ANY PROBLEMS, HOW DIFFICULT HAVE THESE PROBLEMS MADE IT FOR YOU TO DO YOUR WORK, TAKE CARE OF THINGS AT HOME, OR GET ALONG WITH OTHER PEOPLE: NOT DIFFICULT AT ALL
4. FEELING TIRED OR HAVING LITTLE ENERGY: NOT AT ALL
5. POOR APPETITE OR OVEREATING: NOT AT ALL
9. THOUGHTS THAT YOU WOULD BE BETTER OFF DEAD, OR OF HURTING YOURSELF: NOT AT ALL
7. TROUBLE CONCENTRATING ON THINGS, SUCH AS READING THE NEWSPAPER OR WATCHING TELEVISION: NOT AT ALL
SUM OF ALL RESPONSES TO PHQ QUESTIONS 1-9: 0
6. FEELING BAD ABOUT YOURSELF - OR THAT YOU ARE A FAILURE OR HAVE LET YOURSELF OR YOUR FAMILY DOWN: NOT AT ALL
8. MOVING OR SPEAKING SO SLOWLY THAT OTHER PEOPLE COULD HAVE NOTICED. OR THE OPPOSITE, BEING SO FIGETY OR RESTLESS THAT YOU HAVE BEEN MOVING AROUND A LOT MORE THAN USUAL: NOT AT ALL
1. LITTLE INTEREST OR PLEASURE IN DOING THINGS: NOT AT ALL
2. FEELING DOWN, DEPRESSED OR HOPELESS: NOT AT ALL
SUM OF ALL RESPONSES TO PHQ QUESTIONS 1-9: 0
SUM OF ALL RESPONSES TO PHQ9 QUESTIONS 1 & 2: 0
SUM OF ALL RESPONSES TO PHQ QUESTIONS 1-9: 0
SUM OF ALL RESPONSES TO PHQ QUESTIONS 1-9: 0

## 2024-11-21 NOTE — ASSESSMENT & PLAN NOTE
Chronic, worsening (exacerbation),  , medication adherence emphasized, and lifestyle modifications recommended    Orders:    losartan (COZAAR) 25 MG tablet; Take 1 tablet by mouth daily

## 2024-11-21 NOTE — ASSESSMENT & PLAN NOTE
Chronic, not at goal (unstable), continue current plan pending work up below    Orders:    RERE BY IFA W/REFLEX; Future    C-Reactive Protein; Future

## 2024-11-21 NOTE — PROGRESS NOTES
Chief Complaint   Patient presents with    Establish Care     C/O painful lymph nodes under arm     Hypertension       BP (!) 130/92 (Site: Left Upper Arm, Position: Sitting)   Pulse 74   Resp 16   Ht 1.676 m (5' 6\")   Wt 68.6 kg (151 lb 3.2 oz)   SpO2 99%   BMI 24.40 kg/m²       \"Have you been to the ER, urgent care clinic since your last visit?  Hospitalized since your last visit?\"    YES - When: approximately 1 months ago.  Where and Why: abdominal pain 10/24.    “Have you seen or consulted any other health care providers outside our system since your last visit?”    NO    Have you had a mammogram?”   NO    Date of last Mammogram: 3/26/2021       “Have you had a colorectal cancer screening such as a colonoscopy/FIT/Cologuard?    NO    No colonoscopy on file  No cologuard on file  No FIT/FOBT on file   No flexible sigmoidoscopy on file            
prognosis, continue current plan pending work up below    Orders:    Delfino Swartz MD, Hematology/Oncology, [place]    ketorolac (TORADOL) 10 MG tablet; Take 1 tablet by mouth every 6 hours as needed for Pain    Essential hypertension   Chronic, worsening (exacerbation),  , medication adherence emphasized, and lifestyle modifications recommended    Orders:    losartan (COZAAR) 25 MG tablet; Take 1 tablet by mouth daily    Hyperthyroidism   Chronic, at goal (stable), lifestyle and medication adherence emphasized         Graves disease   Chronic, not at goal (unstable), continue current plan pending work up below    Orders:    RERE BY IFA W/REFLEX; Future    C-Reactive Protein; Future    Systemic lupus erythematosus, unspecified SLE type, unspecified organ involvement status (HCC)   Chronic, not at goal (unstable), continue current plan pending work up below, medication adherence emphasized, and lifestyle modifications recommended    Orders:    Lipid Panel; Future    CBC with Auto Differential; Future    Comprehensive Metabolic Panel; Future    REE DIGITAL SCREEN W OR WO CAD BILATERAL; Future    Delfino Swartz MD, Hematology/Oncology, [place]    RERE BY IFA W/REFLEX; Future    C-Reactive Protein; Future    Encounter for general adult medical examination with abnormal findings    Continue planned screening intervention as appropriate, pending work up below.    Orders:    Occult Blood Stool Immunoassay; Future        Aspects of this note have been generated using voice recognition software. Despite editing, there may be some syntax errors.      JAYASHREE Dougherty - NP

## 2024-11-21 NOTE — ASSESSMENT & PLAN NOTE
New, uncertain prognosis, continue current plan pending work up below    Orders:    Delfino Swartz MD, Hematology/Oncology, [place]    ketorolac (TORADOL) 10 MG tablet; Take 1 tablet by mouth every 6 hours as needed for Pain

## 2024-11-22 LAB
ALBUMIN SERPL-MCNC: 4.2 G/DL (ref 3.9–4.9)
ALP SERPL-CCNC: 102 IU/L (ref 44–121)
ALT SERPL-CCNC: 12 IU/L (ref 0–32)
AST SERPL-CCNC: 20 IU/L (ref 0–40)
BASOPHILS # BLD AUTO: 0.1 X10E3/UL (ref 0–0.2)
BASOPHILS NFR BLD AUTO: 1 %
BILIRUB SERPL-MCNC: 0.6 MG/DL (ref 0–1.2)
BUN SERPL-MCNC: 8 MG/DL (ref 6–24)
BUN/CREAT SERPL: 13 (ref 9–23)
CALCIUM SERPL-MCNC: 9.2 MG/DL (ref 8.7–10.2)
CHLORIDE SERPL-SCNC: 102 MMOL/L (ref 96–106)
CHOLEST SERPL-MCNC: 157 MG/DL (ref 100–199)
CO2 SERPL-SCNC: 22 MMOL/L (ref 20–29)
CREAT SERPL-MCNC: 0.64 MG/DL (ref 0.57–1)
CRP SERPL-MCNC: 2 MG/L (ref 0–10)
EGFRCR SERPLBLD CKD-EPI 2021: 109 ML/MIN/1.73
EOSINOPHIL # BLD AUTO: 0.1 X10E3/UL (ref 0–0.4)
EOSINOPHIL NFR BLD AUTO: 1 %
ERYTHROCYTE [DISTWIDTH] IN BLOOD BY AUTOMATED COUNT: 13.2 % (ref 11.7–15.4)
GLOBULIN SER CALC-MCNC: 3.5 G/DL (ref 1.5–4.5)
GLUCOSE SERPL-MCNC: 70 MG/DL (ref 70–99)
HCT VFR BLD AUTO: 38.5 % (ref 34–46.6)
HDLC SERPL-MCNC: 82 MG/DL
HGB BLD-MCNC: 12.4 G/DL (ref 11.1–15.9)
IMM GRANULOCYTES # BLD AUTO: 0 X10E3/UL (ref 0–0.1)
IMM GRANULOCYTES NFR BLD AUTO: 0 %
LDLC SERPL CALC-MCNC: 62 MG/DL (ref 0–99)
LYMPHOCYTES # BLD AUTO: 1.9 X10E3/UL (ref 0.7–3.1)
LYMPHOCYTES NFR BLD AUTO: 20 %
MCH RBC QN AUTO: 28.1 PG (ref 26.6–33)
MCHC RBC AUTO-ENTMCNC: 32.2 G/DL (ref 31.5–35.7)
MCV RBC AUTO: 87 FL (ref 79–97)
MONOCYTES # BLD AUTO: 0.7 X10E3/UL (ref 0.1–0.9)
MONOCYTES NFR BLD AUTO: 7 %
NEUTROPHILS # BLD AUTO: 6.7 X10E3/UL (ref 1.4–7)
NEUTROPHILS NFR BLD AUTO: 71 %
PLATELET # BLD AUTO: 257 X10E3/UL (ref 150–450)
POTASSIUM SERPL-SCNC: 4.6 MMOL/L (ref 3.5–5.2)
PROT SERPL-MCNC: 7.7 G/DL (ref 6–8.5)
RBC # BLD AUTO: 4.42 X10E6/UL (ref 3.77–5.28)
SODIUM SERPL-SCNC: 140 MMOL/L (ref 134–144)
TRIGL SERPL-MCNC: 64 MG/DL (ref 0–149)
VLDLC SERPL CALC-MCNC: 13 MG/DL (ref 5–40)
WBC # BLD AUTO: 9.5 X10E3/UL (ref 3.4–10.8)

## 2024-11-25 LAB
ANA SER QL IF: NEGATIVE
LABORATORY COMMENT REPORT: NORMAL

## 2024-12-12 ENCOUNTER — OFFICE VISIT (OUTPATIENT)
Age: 49
End: 2024-12-12
Payer: COMMERCIAL

## 2024-12-12 VITALS
HEIGHT: 66 IN | OXYGEN SATURATION: 100 % | DIASTOLIC BLOOD PRESSURE: 93 MMHG | BODY MASS INDEX: 24.43 KG/M2 | SYSTOLIC BLOOD PRESSURE: 141 MMHG | RESPIRATION RATE: 18 BRPM | WEIGHT: 152 LBS | TEMPERATURE: 97.3 F | HEART RATE: 73 BPM

## 2024-12-12 DIAGNOSIS — Z01.419 GYNECOLOGIC EXAM NORMAL: Primary | ICD-10-CM

## 2024-12-12 DIAGNOSIS — N89.8 VAGINAL DISCHARGE: ICD-10-CM

## 2024-12-12 DIAGNOSIS — R10.2 PELVIC PAIN: ICD-10-CM

## 2024-12-12 DIAGNOSIS — Z12.72 SCREENING FOR MALIGNANT NEOPLASM OF VAGINA AFTER TOTAL HYSTERECTOMY: ICD-10-CM

## 2024-12-12 DIAGNOSIS — Z90.710 SCREENING FOR MALIGNANT NEOPLASM OF VAGINA AFTER TOTAL HYSTERECTOMY: ICD-10-CM

## 2024-12-12 PROCEDURE — 3077F SYST BP >= 140 MM HG: CPT | Performed by: OBSTETRICS & GYNECOLOGY

## 2024-12-12 PROCEDURE — 99386 PREV VISIT NEW AGE 40-64: CPT | Performed by: OBSTETRICS & GYNECOLOGY

## 2024-12-12 PROCEDURE — 3080F DIAST BP >= 90 MM HG: CPT | Performed by: OBSTETRICS & GYNECOLOGY

## 2024-12-12 RX ORDER — FLUCONAZOLE 150 MG/1
150 TABLET ORAL ONCE
Qty: 1 TABLET | Refills: 0 | Status: SHIPPED | OUTPATIENT
Start: 2024-12-12 | End: 2024-12-12

## 2024-12-19 LAB
CYTOLOGIST CVX/VAG CYTO: NORMAL
CYTOLOGY CVX/VAG DOC CYTO: NORMAL
CYTOLOGY CVX/VAG DOC THIN PREP: NORMAL
DX ICD CODE: NORMAL
HPV GENOTYPE REFLEX: NORMAL
HPV I/H RISK 4 DNA CVX QL PROBE+SIG AMP: NEGATIVE
Lab: NORMAL
OTHER STN SPEC: NORMAL
STAT OF ADQ CVX/VAG CYTO-IMP: NORMAL

## 2025-01-28 ENCOUNTER — OFFICE VISIT (OUTPATIENT)
Age: 50
End: 2025-01-28
Payer: COMMERCIAL

## 2025-01-28 VITALS
WEIGHT: 153 LBS | HEART RATE: 85 BPM | DIASTOLIC BLOOD PRESSURE: 91 MMHG | RESPIRATION RATE: 16 BRPM | SYSTOLIC BLOOD PRESSURE: 140 MMHG | HEIGHT: 66 IN | OXYGEN SATURATION: 100 % | BODY MASS INDEX: 24.59 KG/M2

## 2025-01-28 DIAGNOSIS — N83.202 LEFT OVARIAN CYST: Primary | ICD-10-CM

## 2025-01-28 PROCEDURE — 99214 OFFICE O/P EST MOD 30 MIN: CPT | Performed by: OBSTETRICS & GYNECOLOGY

## 2025-01-28 PROCEDURE — 3080F DIAST BP >= 90 MM HG: CPT | Performed by: OBSTETRICS & GYNECOLOGY

## 2025-01-28 PROCEDURE — 3077F SYST BP >= 140 MM HG: CPT | Performed by: OBSTETRICS & GYNECOLOGY

## 2025-01-29 ENCOUNTER — HOSPITAL ENCOUNTER (OUTPATIENT)
Facility: HOSPITAL | Age: 50
Discharge: HOME | End: 2025-02-01
Attending: INTERNAL MEDICINE
Payer: COMMERCIAL

## 2025-01-29 DIAGNOSIS — R59.0 VIRCHOW'S NODE: ICD-10-CM

## 2025-01-29 PROCEDURE — G0279 TOMOSYNTHESIS, MAMMO: HCPCS

## 2025-01-29 PROCEDURE — 76882 US LMTD JT/FCL EVL NVASC XTR: CPT

## 2025-01-29 NOTE — PROGRESS NOTES
Gretta Gao is a 49 y.o. female, , No LMP recorded. Patient has had a hysterectomy., who presents today for the following:  Chief Complaint   Patient presents with   • Follow-up     U/S follow up        Allergies   Allergen Reactions   • Penicillins Hives and Rash     Reaction Type: Allergy         Current Outpatient Medications   Medication Sig Dispense Refill   • losartan (COZAAR) 25 MG tablet Take 1 tablet by mouth daily 30 tablet 5   • methIMAzole (TAPAZOLE) 5 MG tablet Take 1 tablet  12 tablet 2   • hydroxychloroquine (PLAQUENIL) 200 MG tablet Take 1 tablet by mouth daily     • pregabalin (LYRICA) 50 MG capsule Take 1 capsule by mouth 3 times daily.     • sertraline (ZOLOFT) 100 MG tablet      • propranolol (INDERAL) 20 MG tablet Take 1 tablet by mouth daily     • ketorolac (TORADOL) 10 MG tablet Take 1 tablet by mouth every 6 hours as needed for Pain 20 tablet 5   • clobetasol (TEMOVATE) 0.05 % cream apply to affected area twice a day if needed for 30 DAYS       No current facility-administered medications for this visit.         Past Medical History:   Diagnosis Date   • Dysphagia 2020   • Hx traumatic fracture    • Hyperthyroidism    • Insomnia 2020   • Vaginitis, atrophic        Past Surgical History:   Procedure Laterality Date   • HYSTERECTOMY (CERVIX STATUS UNKNOWN)     • ORTHOPEDIC SURGERY Right 2024    shoulder   • OTHER SURGICAL HISTORY      bladder had to be fixed it was cut during hysterectomy   • ROTATOR CUFF REPAIR Right 10/2021       Family History   Problem Relation Age of Onset   • Hypertension Father    • Thyroid Disease Maternal Aunt    • Thyroid Disease Maternal Aunt    • Hypertension Mother        Social History     Socioeconomic History   • Marital status: Single     Spouse name: Not on file   • Number of children: Not on file   • Years of education: Not on file   • Highest education level: Not on file   Occupational History   • Not on

## 2025-02-04 ENCOUNTER — OFFICE VISIT (OUTPATIENT)
Age: 50
End: 2025-02-04
Payer: COMMERCIAL

## 2025-02-04 VITALS
HEIGHT: 66 IN | BODY MASS INDEX: 24.48 KG/M2 | DIASTOLIC BLOOD PRESSURE: 90 MMHG | HEART RATE: 70 BPM | WEIGHT: 152.3 LBS | SYSTOLIC BLOOD PRESSURE: 133 MMHG | TEMPERATURE: 98.6 F | RESPIRATION RATE: 16 BRPM | OXYGEN SATURATION: 99 %

## 2025-02-04 DIAGNOSIS — E05.00 GRAVES DISEASE: ICD-10-CM

## 2025-02-04 DIAGNOSIS — E05.00 GRAVES DISEASE: Primary | ICD-10-CM

## 2025-02-04 PROCEDURE — 3075F SYST BP GE 130 - 139MM HG: CPT | Performed by: STUDENT IN AN ORGANIZED HEALTH CARE EDUCATION/TRAINING PROGRAM

## 2025-02-04 PROCEDURE — 99213 OFFICE O/P EST LOW 20 MIN: CPT | Performed by: STUDENT IN AN ORGANIZED HEALTH CARE EDUCATION/TRAINING PROGRAM

## 2025-02-04 PROCEDURE — 3080F DIAST BP >= 90 MM HG: CPT | Performed by: STUDENT IN AN ORGANIZED HEALTH CARE EDUCATION/TRAINING PROGRAM

## 2025-02-04 NOTE — PROGRESS NOTES
[facility]                                                                                    Blank Luis M.D          Patient Information  Date:2/4/2025  Name : Gretta Gao 49 y.o.     YOB: 1975         Referred by: Bret ePnn APRN - NP       Chief Complaint   Patient presents with    Follow-up    Thyroid Problem       History of Present Illness: Gretta Gao is a 49 y.o. female with Graves disease who presented to the clinic. Previously see by Dr. Hillman, last seen in 2022.      2-4-2025 presents for follow up   Doing well on methimazole.         9-19-24  Ran out of methimazole  Underwent surgery       5-10-24     She was diagnosed with Graves approximately in 2011/2012. Had been on PTU then Methimazole. Has been off methimazole intermittently due to insurance issues.   Last labs in 2023.   Off methimazole since  Last July 2023   Has noticed palpitations and jitteriness.  No change in weight  Has diarrhea.     Reports eye irritation and dryness, feels like right eye is bulging.     Does not smoke   Has not seen ophthalmologist.     Past Medical History:   Diagnosis Date    Dysphagia 11/4/2020    Hx traumatic fracture     Hyperthyroidism     Insomnia 11/4/2020    Vaginitis, atrophic        Past Surgical History:   Procedure Laterality Date    HYSTERECTOMY (CERVIX STATUS UNKNOWN)  2017    ORTHOPEDIC SURGERY Right 07/2024    shoulder    OTHER SURGICAL HISTORY      bladder had to be fixed it was cut during hysterectomy    ROTATOR CUFF REPAIR Right 10/2021        Social History     Socioeconomic History    Marital status: Single     Spouse name: Not on file    Number of children: Not on file    Years of education: Not on file    Highest education level: Not on file   Occupational History    Not on file   Tobacco Use    Smoking status: Never     Passive exposure: Never    Smokeless tobacco: Never   Vaping Use    Vaping status: Never Used

## 2025-02-04 NOTE — PROGRESS NOTES
\"Have you been to the ER, urgent care clinic since your last visit?  Hospitalized since your last visit?\"    NO    “Have you seen or consulted any other health care providers outside our system since your last visit?”    YES - When: approximately 1/2025 ago.  Where and Why: [facility].      “Have you had a colorectal cancer screening such as a colonoscopy/FIT/Cologuard?    NO    No colonoscopy on file  No cologuard on file  No FIT/FOBT on file   No flexible sigmoidoscopy on file     Chief Complaint   Patient presents with    Follow-up    Thyroid Problem     BP (!) 133/90 (Site: Left Upper Arm, Position: Sitting, Cuff Size: Medium Adult)   Pulse 70   Temp 98.6 °F (37 °C) (Temporal)   Resp 16   Ht 1.676 m (5' 6\")   Wt 69.1 kg (152 lb 4.8 oz)   SpO2 99%   BMI 24.58 kg/m²

## 2025-02-05 LAB
T4 FREE SERPL-MCNC: 1.23 NG/DL (ref 0.82–1.77)
TSH SERPL DL<=0.005 MIU/L-ACNC: 0.43 UIU/ML (ref 0.45–4.5)

## 2025-02-21 ENCOUNTER — PATIENT MESSAGE (OUTPATIENT)
Facility: CLINIC | Age: 50
End: 2025-02-21

## 2025-03-12 DIAGNOSIS — N83.201 CYST OF RIGHT OVARY: Primary | ICD-10-CM

## 2025-05-14 ENCOUNTER — OFFICE VISIT (OUTPATIENT)
Facility: CLINIC | Age: 50
End: 2025-05-14
Payer: COMMERCIAL

## 2025-05-14 VITALS
BODY MASS INDEX: 24.59 KG/M2 | OXYGEN SATURATION: 100 % | SYSTOLIC BLOOD PRESSURE: 109 MMHG | HEIGHT: 66 IN | WEIGHT: 153 LBS | DIASTOLIC BLOOD PRESSURE: 72 MMHG | TEMPERATURE: 98.3 F | HEART RATE: 78 BPM

## 2025-05-14 DIAGNOSIS — K91.5 POSTCHOLECYSTECTOMY SYNDROME: ICD-10-CM

## 2025-05-14 DIAGNOSIS — R19.7 ABDOMINAL PAIN, VOMITING, AND DIARRHEA: Primary | ICD-10-CM

## 2025-05-14 DIAGNOSIS — R11.10 ABDOMINAL PAIN, VOMITING, AND DIARRHEA: Primary | ICD-10-CM

## 2025-05-14 DIAGNOSIS — R10.9 ABDOMINAL PAIN, VOMITING, AND DIARRHEA: Primary | ICD-10-CM

## 2025-05-14 PROCEDURE — 3078F DIAST BP <80 MM HG: CPT | Performed by: NURSE PRACTITIONER

## 2025-05-14 PROCEDURE — 3074F SYST BP LT 130 MM HG: CPT | Performed by: NURSE PRACTITIONER

## 2025-05-14 PROCEDURE — 99204 OFFICE O/P NEW MOD 45 MIN: CPT | Performed by: NURSE PRACTITIONER

## 2025-05-14 RX ORDER — ESOMEPRAZOLE MAGNESIUM 40 MG/1
40 CAPSULE, DELAYED RELEASE ORAL
Qty: 90 CAPSULE | Refills: 1 | Status: SHIPPED | OUTPATIENT
Start: 2025-05-14

## 2025-05-14 RX ORDER — ONDANSETRON 4 MG/1
4 TABLET, FILM COATED ORAL 3 TIMES DAILY PRN
Qty: 30 TABLET | Refills: 0 | Status: SHIPPED | OUTPATIENT
Start: 2025-05-14

## 2025-05-14 RX ORDER — CHOLESTYRAMINE 4 G/9G
1 POWDER, FOR SUSPENSION ORAL 2 TIMES DAILY
Qty: 90 PACKET | Refills: 3 | Status: SHIPPED | OUTPATIENT
Start: 2025-05-14

## 2025-05-14 ASSESSMENT — ENCOUNTER SYMPTOMS
COUGH: 0
TROUBLE SWALLOWING: 0
EYE DISCHARGE: 0
RHINORRHEA: 0
WHEEZING: 0
ABDOMINAL PAIN: 0
CHEST TIGHTNESS: 0
EYE REDNESS: 0
SORE THROAT: 0
SHORTNESS OF BREATH: 0
EYE PAIN: 0
NAUSEA: 1
COLOR CHANGE: 0
VOMITING: 1

## 2025-05-14 NOTE — ASSESSMENT & PLAN NOTE
Chronic, worsening (exacerbation), continue current plan pending work up below, medication adherence emphasized, and lifestyle modifications recommended    Orders:    esomeprazole (NEXIUM) 40 MG delayed release capsule; Take 1 capsule by mouth every morning (before breakfast)    CT ABDOMEN WO CONTRAST Additional Contrast? None; Future    ondansetron (ZOFRAN) 4 MG tablet; Take 1 tablet by mouth 3 times daily as needed for Nausea or Vomiting

## 2025-05-14 NOTE — PROGRESS NOTES
Chief Complaint   Patient presents with    Other     Discuss stomach issues    /72 (BP Site: Left Upper Arm, Patient Position: Sitting, BP Cuff Size: Medium Adult)   Pulse 78   Temp 98.3 °F (36.8 °C) (Oral)   Ht 1.676 m (5' 6\")   Wt 69.4 kg (153 lb)   SpO2 100%   BMI 24.69 kg/m²    Have you been to the ER, urgent care clinic since your last visit?  Hospitalized since your last visit?   NO    Have you seen or consulted any other health care providers outside our system since your last visit?   NO      “Have you had a colorectal cancer screening such as a colonoscopy/FIT/Cologuard?    NO    No colonoscopy on file  No cologuard on file  No FIT/FOBT on file   No flexible sigmoidoscopy on file          
packet by mouth 2 times daily Take 1 packet mixed in water or juice BEFORE meal twice daily    ondansetron (ZOFRAN) 4 MG tablet; Take 1 tablet by mouth 3 times daily as needed for Nausea or Vomiting        Aspects of this note have been generated using voice recognition software. Despite editing, there may be some syntax errors.      JAYASHREE Dougherty - NP

## 2025-05-15 NOTE — ASSESSMENT & PLAN NOTE
Chronic, worsening (exacerbation), continue current plan pending work up below, medication adherence emphasized, and lifestyle modifications recommended    Orders:    cholestyramine (QUESTRAN) 4 g packet; Take 1 packet by mouth 2 times daily Take 1 packet mixed in water or juice BEFORE meal twice daily    ondansetron (ZOFRAN) 4 MG tablet; Take 1 tablet by mouth 3 times daily as needed for Nausea or Vomiting

## 2025-06-12 DIAGNOSIS — N83.209 CYST OF OVARY, UNSPECIFIED LATERALITY: Primary | ICD-10-CM

## 2025-06-20 ENCOUNTER — TELEPHONE (OUTPATIENT)
Facility: CLINIC | Age: 50
End: 2025-06-20

## 2025-07-24 ENCOUNTER — OFFICE VISIT (OUTPATIENT)
Age: 50
End: 2025-07-24
Payer: COMMERCIAL

## 2025-07-24 VITALS
BODY MASS INDEX: 24.43 KG/M2 | HEIGHT: 66 IN | SYSTOLIC BLOOD PRESSURE: 133 MMHG | OXYGEN SATURATION: 100 % | RESPIRATION RATE: 16 BRPM | HEART RATE: 74 BPM | WEIGHT: 152 LBS | DIASTOLIC BLOOD PRESSURE: 90 MMHG

## 2025-07-24 DIAGNOSIS — R10.2 PELVIC PAIN: Primary | ICD-10-CM

## 2025-07-24 PROCEDURE — 99214 OFFICE O/P EST MOD 30 MIN: CPT | Performed by: OBSTETRICS & GYNECOLOGY

## 2025-07-24 PROCEDURE — 3080F DIAST BP >= 90 MM HG: CPT | Performed by: OBSTETRICS & GYNECOLOGY

## 2025-07-24 PROCEDURE — 3075F SYST BP GE 130 - 139MM HG: CPT | Performed by: OBSTETRICS & GYNECOLOGY

## 2025-07-26 NOTE — PROGRESS NOTES
Gretta Gao is a 49 y.o. female, , No LMP recorded. Patient has had a hysterectomy., who presents today for the following:  Chief Complaint   Patient presents with    Follow-up     Discuss ultrasound results        Allergies   Allergen Reactions    Penicillins Hives and Rash     Reaction Type: Allergy         Current Outpatient Medications   Medication Sig Dispense Refill    esomeprazole (NEXIUM) 40 MG delayed release capsule Take 1 capsule by mouth every morning (before breakfast) 90 capsule 1    cholestyramine (QUESTRAN) 4 g packet Take 1 packet by mouth 2 times daily Take 1 packet mixed in water or juice BEFORE meal twice daily 90 packet 3    ondansetron (ZOFRAN) 4 MG tablet Take 1 tablet by mouth 3 times daily as needed for Nausea or Vomiting 30 tablet 0    losartan (COZAAR) 25 MG tablet Take 1 tablet by mouth daily (Patient not taking: Reported on 2025) 30 tablet 5    ketorolac (TORADOL) 10 MG tablet Take 1 tablet by mouth every 6 hours as needed for Pain 20 tablet 5    methIMAzole (TAPAZOLE) 5 MG tablet Take 1 tablet  12 tablet 2    hydroxychloroquine (PLAQUENIL) 200 MG tablet Take 1 tablet by mouth daily      pregabalin (LYRICA) 50 MG capsule Take 1 capsule by mouth 3 times daily.      sertraline (ZOLOFT) 100 MG tablet       clobetasol (TEMOVATE) 0.05 % cream apply to affected area twice a day if needed for 30 DAYS      propranolol (INDERAL) 20 MG tablet Take 1 tablet by mouth daily       No current facility-administered medications for this visit.         Past Medical History:   Diagnosis Date    Dysphagia 2020    Hx traumatic fracture     Hyperthyroidism     Insomnia 2020    Vaginitis, atrophic        Past Surgical History:   Procedure Laterality Date    HYSTERECTOMY (CERVIX STATUS UNKNOWN)  2017    ORTHOPEDIC SURGERY Right 2024    shoulder    OTHER SURGICAL HISTORY      bladder had to be fixed it was cut during hysterectomy    ROTATOR CUFF REPAIR Right